# Patient Record
Sex: FEMALE | Race: ASIAN | NOT HISPANIC OR LATINO | Employment: UNEMPLOYED | URBAN - METROPOLITAN AREA
[De-identification: names, ages, dates, MRNs, and addresses within clinical notes are randomized per-mention and may not be internally consistent; named-entity substitution may affect disease eponyms.]

---

## 2020-03-11 ENCOUNTER — MEDICAL CORRESPONDENCE (OUTPATIENT)
Dept: HEALTH INFORMATION MANAGEMENT | Facility: CLINIC | Age: 30
End: 2020-03-11

## 2021-09-23 ENCOUNTER — OFFICE VISIT (OUTPATIENT)
Dept: FAMILY MEDICINE | Facility: CLINIC | Age: 31
End: 2021-09-23
Payer: MEDICAID

## 2021-09-23 VITALS
HEART RATE: 73 BPM | HEIGHT: 57 IN | OXYGEN SATURATION: 99 % | WEIGHT: 110.8 LBS | DIASTOLIC BLOOD PRESSURE: 77 MMHG | SYSTOLIC BLOOD PRESSURE: 114 MMHG | RESPIRATION RATE: 16 BRPM | TEMPERATURE: 98.2 F | BODY MASS INDEX: 23.9 KG/M2

## 2021-09-23 DIAGNOSIS — Z02.89 HEALTH EXAMINATION OF DEFINED SUBPOPULATION: Primary | ICD-10-CM

## 2021-09-23 DIAGNOSIS — Z30.41 ENCOUNTER FOR SURVEILLANCE OF CONTRACEPTIVE PILLS: ICD-10-CM

## 2021-09-23 LAB
ALBUMIN SERPL-MCNC: 4.3 G/DL (ref 3.5–5)
ALP SERPL-CCNC: 49 U/L (ref 45–120)
ALT SERPL W P-5'-P-CCNC: 24 U/L (ref 0–45)
ANION GAP SERPL CALCULATED.3IONS-SCNC: 13 MMOL/L (ref 5–18)
AST SERPL W P-5'-P-CCNC: 20 U/L (ref 0–40)
BASOPHILS # BLD AUTO: 0.1 10E3/UL (ref 0–0.2)
BASOPHILS NFR BLD AUTO: 1 %
BILIRUB SERPL-MCNC: 0.4 MG/DL (ref 0–1)
BUN SERPL-MCNC: 6 MG/DL (ref 8–22)
CALCIUM SERPL-MCNC: 9.5 MG/DL (ref 8.5–10.5)
CHLORIDE BLD-SCNC: 108 MMOL/L (ref 98–107)
CHOLEST SERPL-MCNC: 174 MG/DL
CO2 SERPL-SCNC: 20 MMOL/L (ref 22–31)
CREAT SERPL-MCNC: 0.71 MG/DL (ref 0.6–1.1)
EOSINOPHIL # BLD AUTO: 0.2 10E3/UL (ref 0–0.7)
EOSINOPHIL NFR BLD AUTO: 2 %
ERYTHROCYTE [DISTWIDTH] IN BLOOD BY AUTOMATED COUNT: 12.7 % (ref 10–15)
FASTING STATUS PATIENT QL REPORTED: NO
GFR SERPL CREATININE-BSD FRML MDRD: >90 ML/MIN/1.73M2
GLUCOSE BLD-MCNC: 95 MG/DL (ref 70–125)
HCG UR QL: NEGATIVE
HCT VFR BLD AUTO: 43.1 % (ref 35–47)
HDLC SERPL-MCNC: 42 MG/DL
HGB BLD-MCNC: 14.3 G/DL (ref 11.7–15.7)
HIV 1+2 AB+HIV1 P24 AG SERPL QL IA: NEGATIVE
IMM GRANULOCYTES # BLD: 0 10E3/UL
IMM GRANULOCYTES NFR BLD: 0 %
LDLC SERPL CALC-MCNC: 119 MG/DL
LYMPHOCYTES # BLD AUTO: 2 10E3/UL (ref 0.8–5.3)
LYMPHOCYTES NFR BLD AUTO: 21 %
MCH RBC QN AUTO: 29.7 PG (ref 26.5–33)
MCHC RBC AUTO-ENTMCNC: 33.2 G/DL (ref 31.5–36.5)
MCV RBC AUTO: 89 FL (ref 78–100)
MONOCYTES # BLD AUTO: 0.4 10E3/UL (ref 0–1.3)
MONOCYTES NFR BLD AUTO: 5 %
NEUTROPHILS # BLD AUTO: 6.9 10E3/UL (ref 1.6–8.3)
NEUTROPHILS NFR BLD AUTO: 71 %
NRBC # BLD AUTO: 0 10E3/UL
NRBC BLD AUTO-RTO: 0 /100
PLATELET # BLD AUTO: 262 10E3/UL (ref 150–450)
POTASSIUM BLD-SCNC: 3.5 MMOL/L (ref 3.5–5)
PROT SERPL-MCNC: 7.5 G/DL (ref 6–8)
RBC # BLD AUTO: 4.82 10E6/UL (ref 3.8–5.2)
SODIUM SERPL-SCNC: 141 MMOL/L (ref 136–145)
TRIGL SERPL-MCNC: 66 MG/DL
WBC # BLD AUTO: 9.6 10E3/UL (ref 4–11)

## 2021-09-23 PROCEDURE — 86704 HEP B CORE ANTIBODY TOTAL: CPT | Performed by: STUDENT IN AN ORGANIZED HEALTH CARE EDUCATION/TRAINING PROGRAM

## 2021-09-23 PROCEDURE — 86708 HEPATITIS A ANTIBODY: CPT | Performed by: STUDENT IN AN ORGANIZED HEALTH CARE EDUCATION/TRAINING PROGRAM

## 2021-09-23 PROCEDURE — 99385 PREV VISIT NEW AGE 18-39: CPT | Mod: GC | Performed by: STUDENT IN AN ORGANIZED HEALTH CARE EDUCATION/TRAINING PROGRAM

## 2021-09-23 PROCEDURE — 36415 COLL VENOUS BLD VENIPUNCTURE: CPT | Performed by: STUDENT IN AN ORGANIZED HEALTH CARE EDUCATION/TRAINING PROGRAM

## 2021-09-23 PROCEDURE — 86803 HEPATITIS C AB TEST: CPT | Performed by: STUDENT IN AN ORGANIZED HEALTH CARE EDUCATION/TRAINING PROGRAM

## 2021-09-23 PROCEDURE — 80053 COMPREHEN METABOLIC PANEL: CPT | Performed by: STUDENT IN AN ORGANIZED HEALTH CARE EDUCATION/TRAINING PROGRAM

## 2021-09-23 PROCEDURE — 87591 N.GONORRHOEAE DNA AMP PROB: CPT | Performed by: STUDENT IN AN ORGANIZED HEALTH CARE EDUCATION/TRAINING PROGRAM

## 2021-09-23 PROCEDURE — 86706 HEP B SURFACE ANTIBODY: CPT | Performed by: STUDENT IN AN ORGANIZED HEALTH CARE EDUCATION/TRAINING PROGRAM

## 2021-09-23 PROCEDURE — 87340 HEPATITIS B SURFACE AG IA: CPT | Performed by: STUDENT IN AN ORGANIZED HEALTH CARE EDUCATION/TRAINING PROGRAM

## 2021-09-23 PROCEDURE — 99000 SPECIMEN HANDLING OFFICE-LAB: CPT | Performed by: STUDENT IN AN ORGANIZED HEALTH CARE EDUCATION/TRAINING PROGRAM

## 2021-09-23 PROCEDURE — 85025 COMPLETE CBC W/AUTO DIFF WBC: CPT | Performed by: STUDENT IN AN ORGANIZED HEALTH CARE EDUCATION/TRAINING PROGRAM

## 2021-09-23 PROCEDURE — 80061 LIPID PANEL: CPT | Performed by: STUDENT IN AN ORGANIZED HEALTH CARE EDUCATION/TRAINING PROGRAM

## 2021-09-23 PROCEDURE — 86787 VARICELLA-ZOSTER ANTIBODY: CPT | Performed by: STUDENT IN AN ORGANIZED HEALTH CARE EDUCATION/TRAINING PROGRAM

## 2021-09-23 PROCEDURE — 81025 URINE PREGNANCY TEST: CPT | Performed by: STUDENT IN AN ORGANIZED HEALTH CARE EDUCATION/TRAINING PROGRAM

## 2021-09-23 PROCEDURE — 86780 TREPONEMA PALLIDUM: CPT | Performed by: STUDENT IN AN ORGANIZED HEALTH CARE EDUCATION/TRAINING PROGRAM

## 2021-09-23 PROCEDURE — 87389 HIV-1 AG W/HIV-1&-2 AB AG IA: CPT | Performed by: STUDENT IN AN ORGANIZED HEALTH CARE EDUCATION/TRAINING PROGRAM

## 2021-09-23 PROCEDURE — 86682 HELMINTH ANTIBODY: CPT | Mod: 90 | Performed by: STUDENT IN AN ORGANIZED HEALTH CARE EDUCATION/TRAINING PROGRAM

## 2021-09-23 PROCEDURE — 86481 TB AG RESPONSE T-CELL SUSP: CPT | Performed by: STUDENT IN AN ORGANIZED HEALTH CARE EDUCATION/TRAINING PROGRAM

## 2021-09-23 RX ORDER — LEVONORGESTREL AND ETHINYL ESTRADIOL 0.15-0.03
1 KIT ORAL DAILY
Qty: 28 TABLET | Refills: 3 | Status: SHIPPED | OUTPATIENT
Start: 2021-09-23 | End: 2021-11-05

## 2021-09-23 ASSESSMENT — MIFFLIN-ST. JEOR: SCORE: 1100.34

## 2021-09-23 NOTE — PROGRESS NOTES
Initial Refugee Screening Exam    PC staff should enter immunizations into the chart. (RN entered.)     used this visit: A lmbang  was used for this visit.     HEALTH HISTORY    Concerns today:     Yes - She is taking the birth control pill which she received in [place]. She needs to get them here. She is on levonogestral 0.15mg/Ethylestradiol 0.03mg. No issues with current BC.     Country of Origin:  [place]  Year left country of Origin: 1996  Other countries lived in and dates: [place]  Date of Arrival in [place]: 8/25/2021  Is our listed age correct? Yes  Do you go by any other name?: No  Native Language: Concepc[place]  Family in [place]: Mom in [place]  Family in other countries: None    Pre-Departure Medical Screening Examination Reviewed: Yes   Class A conditions: No  Class B conditions: No  Presumptive treatment for intestinal parasites?: Yes - Albendazole and Ivermectin  History of BCG vaccination: No  Chronic or serious illness: No  Hospitalizations: No  Trauma: No    Family history, medication list, problem list and allergies were reviewed and updated as needed in Epic.    ROS:    C: NEGATIVE for fever, chills, change in weight  I: NEGATIVE for worrisome rashes, moles or lesions, spots without sensations (e.g. leprosy)  E: NEGATIVE for vision changes or irritation or red eyes  E/M: NEGATIVE for ear, mouth and throat problems  R: NEGATIVE for significant cough or SOB  CV: NEGATIVE for chest pain, palpitations or peripheral edema  GI: NEGATIVE for nausea, abdominal pain, heartburn, or change in bowel habits  : NEGATIVE for frequency, dysuria, or hematuria  M: NEGATIVE for significant arthralgias or myalgia  N: NEGATIVE for weakness, dizziness or paresthesias, headaches  E: NEGATIVE for temperature intolerance, skin/hair changes  H: NEGATIVE for bleeding problems  P: NEGATIVE for nightmares, no sleep problems, not easily saddened or angered    Mental Health:    1. In the past month, have you had  many bad dreams or nightmares that remind you of   things that happened in your country or refugee camp? Yes - dreams of running away sometimes  2. In the past month, have you felt very sad? No  3. In the past month, have you been thinking too much about the past (even if you did   not want to?) No  4. In the past month, have you avoided situations that remind you of the past? No  (Prompt: Do you turn off the radio or TV if the program is disturbing?)   5. Do any of these problems make it difficult to do what you need to do on a daily   basis?  (Prompt: Are you able to take care of yourself and your family?) No      EXAMINATION:  There were no vitals taken for this visit.  GENERAL: healthy, alert, well nourished, well hydrated, no distress  HENT: ear canals- normal; TMs- normal; Nose- normal; Mouth- no ulcers, no lesions  NECK: no tenderness, no adenopathy, no asymmetry, no masses, no stiffness; thyroid- normal to palpation  RESP: lungs clear to auscultation - no rales, no rhonchi, no wheezes  CV: regular rates and rhythm, normal S1 S2, no S3 or S4 and no murmur, no click or rub -  ABDOMEN: soft, no tenderness, no  hepatosplenomegaly, no masses, normal bowel sounds  SKIN: no suspicious lesions, no rashes  NEURO: strength and tone- normal, sensory exam- grossly normal, mentation- intact, speech- normal, reflexes- symmetric    ASSESSMENT:    1. New Arrival Health Screening   Health examination of defined subpopulation    2. Encounter for surveillance of contraceptive pills  No issues with her pack currently, will refill same dosing. Found closest pharmacy to their home which is [facility]. Had the  help explain/translate where this is.   - levonorgestrel-ethinyl estradiol (NORDETTE) 0.15-30 MG-MCG tablet; Take 1 tablet by mouth daily  Dispense: 28 tablet; Refill: 3      PLAN:    1) Labs:    CMP  Lipid if age >18  CBC with diff  TB Quant if age>5  RPR  Strongyloides Ab  Schistosoma Ab  HIV  Heb B Core Ab  Hep B  Surface Ab  Hep B Surface Ag  Hep C Ab  Hep A Ab  Varicella titer  Urine for GC/Chlamydia if pt of sexually active age  Urine Pregnancy Test if female of childbearing age     2) TB:    PPD if 6 months to under 2 years old, TB Quant if over 2 years old    3) Immunizations to be applied at second visit.      Total of 30 minutes was spent in face to face contact with patient with > 50% in counseling and coordination of care.  Options for treatment and/or follow-up care were reviewed with the patient. Dolly Rich was engaged and actively involved in the decision making process. She verbalized understanding of the options discussed and was satisfied with the final plan.    RTC in 2-3 weeks for discussion of results, treatment (if necessary) and  devlopment of an ongoing plan for care    Discussed with Dr. Bender.    Rubia Cannon MD PGY3  [facility]

## 2021-09-23 NOTE — PROGRESS NOTES
Preceptor Attestation:   Patient seen, evaluated and discussed with the resident. I have verified the content of the note, which accurately reflects my assessment of the patient and the plan of care.   Supervising Physician:  Jordi Bender MD

## 2021-09-24 LAB
GAMMA INTERFERON BACKGROUND BLD IA-ACNC: 0.15 IU/ML
HAV IGG SER QL IA: POSITIVE
HBV CORE AB SERPL QL IA: NEGATIVE
HBV SURFACE AB SERPLBLD QL IA.RAPID: POSITIVE
HBV SURFACE AG SERPL QL IA: NONREACTIVE
HCV AB SERPL QL IA: NEGATIVE
M TB IFN-G BLD-IMP: NEGATIVE
M TB IFN-G CD4+ BCKGRND COR BLD-ACNC: 9.85 IU/ML
MITOGEN IGNF BCKGRD COR BLD-ACNC: 0.03 IU/ML
MITOGEN IGNF BCKGRD COR BLD-ACNC: 0.08 IU/ML
N GONORRHOEA DNA SPEC QL NAA+PROBE: NEGATIVE
QUANTIFERON MITOGEN: 10 IU/ML
QUANTIFERON NIL TUBE: 0.15 IU/ML
QUANTIFERON TB1 TUBE: 0.18 IU/ML
QUANTIFERON TB2 TUBE: 0.23
T PALLIDUM AB SER QL: NEGATIVE
VZV IGG SER QL IA: POSITIVE

## 2021-09-25 LAB — STRONGYLOIDES IGG SER IA-ACNC: 0.8 IV

## 2021-09-30 LAB — SCHISTOSOMA IGG SER QL: NEGATIVE

## 2021-10-26 NOTE — PROGRESS NOTES
REFUGEE SCREENING: SECOND VISIT    Subjective:   Would like to have another baby and asking if she should stop the birth control. No PNV at home.     Labs from Initial Refugee Screening Visit were reviewed       No visits with results within 1 Month(s) from this visit.   Latest known visit with results is:   Office Visit on 09/23/2021   Component Date Value Ref Range Status     hCG Urine Qualitative 09/23/2021 Negative  Negative Final    This test is for screening purposes.  Results should be interpreted along with the clinical picture.  Confirmation testing is available if warranted by ordering [id number], HCG Quantitative Pregnancy.     Cholesterol 09/23/2021 174  <=199 mg/dL Final     Triglycerides 09/23/2021 66  <=149 mg/dL Final     Direct Measure HDL 09/23/2021 42* >=50 mg/dL Final    HDL Cholesterol Reference Range:     0-2 years:   No reference ranges established for patients under 2 years old  at [facility] for lipid analytes.    2-8 years:  Greater than 45 mg/dL     18 years and older:   Female: Greater than or equal to 50 mg/dL   Male:   Greater than or equal to 40 mg/dL     LDL Cholesterol Calculated 09/23/2021 119  <=129 mg/dL Final     Patient Fasting > 8hrs? 09/23/2021 No   Final     Hepatitis B Surface Antigen 09/23/2021 Nonreactive  Nonreactive Final     Varicella Zoster Antibody IgG 09/23/2021 Positive   Final     Hepatitis C Antibody 09/23/2021 Negative  Negative Final     Hepatitis B Surface Antibody 09/23/2021 Positive* Negative Final     Hepatitis B Core Antibody Total 09/23/2021 Negative  Negative Final     Hepatitis A Antibody IgG 09/23/2021 Positive* Negative Final     HIV Antigen Antibody Combo 09/23/2021 Negative  Negative Final     Strongyloides Claudine IgG 09/23/2021 0.8  <=0.9 IV Final    INTERPRETIVE INFORMATION: Strongyloides Ab, IgG by ELINA      0.9 IV or less....... Negative - No significant                          level of Strongyloides IgG                          antibody  detected.       1.0 IV................Equivocal - The Strongyloides IgG                           antibody result is borderline and                           therefore inconclusive. Recommend                           retesting the patient in 2-4 weeks,                          if clinically indicated.      1.1 IV or greater ... Positive - IgG antibodies to                          Strongyloides detected, which                          may suggest current or past                          infection.    False-positive results may occur with prior exposure to   other helminth infections. Testing low-prevalence   populations may also result in false-positive results.     Treponema Antibody Total 09/23/2021 Negative  Negative Final     Sodium 09/23/2021 141  136 - 145 mmol/L Final     Potassium 09/23/2021 3.5  3.5 - 5.0 mmol/L Final     Chloride 09/23/2021 108* 98 - 107 mmol/L Final     Carbon Dioxide (CO2) 09/23/2021 20* 22 - 31 mmol/L Final     Anion Gap 09/23/2021 13  5 - 18 mmol/L Final     Urea Nitrogen 09/23/2021 6* 8 - 22 mg/dL Final     Creatinine 09/23/2021 0.71  0.60 - 1.10 mg/dL Final     Calcium 09/23/2021 9.5  8.5 - 10.5 mg/dL Final     Glucose 09/23/2021 95  70 - 125 mg/dL Final     Alkaline Phosphatase 09/23/2021 49  45 - 120 U/L Final     AST 09/23/2021 20  0 - 40 U/L Final     ALT 09/23/2021 24  0 - 45 U/L Final     Protein Total 09/23/2021 7.5  6.0 - 8.0 g/dL Final     Albumin 09/23/2021 4.3  3.5 - 5.0 g/dL Final     Bilirubin Total 09/23/2021 0.4  0.0 - 1.0 mg/dL Final     GFR Estimate 09/23/2021 >90  >60 mL/min/1.73m2 Final    As of July 11, 2021, eGFR is calculated by the CKD-EPI creatinine equation, without race adjustment. eGFR can be influenced by muscle mass, exercise, and diet. The reported eGFR is an estimation only and is only applicable if the renal function is stable.     Neisseria gonorrhoeae 09/23/2021 Negative  Negative Final    Negative for N. gonorrhoeae rRNA by transcription mediated  amplification. A negative result by transcription mediated amplification does not preclude the presence of C. trachomatis infection because results are dependent on proper and adequate collection, absence of inhibitors and sufficient rRNA to be detected.     Schistosoma Ab IgG 09/23/2021 Negative  Negative Final     WBC Count 09/23/2021 9.6  4.0 - 11.0 10e3/uL Final     RBC Count 09/23/2021 4.82  3.80 - 5.20 10e6/uL Final     Hemoglobin 09/23/2021 14.3  11.7 - 15.7 g/dL Final     Hematocrit 09/23/2021 43.1  35.0 - 47.0 % Final     MCV 09/23/2021 89  78 - 100 fL Final     MCH 09/23/2021 29.7  26.5 - 33.0 pg Final     MCHC 09/23/2021 33.2  31.5 - 36.5 g/dL Final     RDW 09/23/2021 12.7  10.0 - 15.0 % Final     Platelet Count 09/23/2021 262  150 - 450 10e3/uL Final     % Neutrophils 09/23/2021 71  % Final     % Lymphocytes 09/23/2021 21  % Final     % Monocytes 09/23/2021 5  % Final     % Eosinophils 09/23/2021 2  % Final     % Basophils 09/23/2021 1  % Final     % Immature Granulocytes 09/23/2021 0  % Final     NRBCs per 100 WBC 09/23/2021 0  <1 /100 Final     Absolute Neutrophils 09/23/2021 6.9  1.6 - 8.3 10e3/uL Final     Absolute Lymphocytes 09/23/2021 2.0  0.8 - 5.3 10e3/uL Final     Absolute Monocytes 09/23/2021 0.4  0.0 - 1.3 10e3/uL Final     Absolute Eosinophils 09/23/2021 0.2  0.0 - 0.7 10e3/uL Final     Absolute Basophils 09/23/2021 0.1  0.0 - 0.2 10e3/uL Final     Absolute Immature Granulocytes 09/23/2021 0.0  <=0.0 10e3/uL Final     Absolute NRBCs 09/23/2021 0.0  10e3/uL Final     Quantiferon Nil Tube 09/23/2021 0.15  IU/mL Final     Quantiferon TB1 Tube 09/23/2021 0.18  IU/mL Final     Quantiferon TB2 Tube 09/23/2021 0.23   Final     Quantiferon Mitogen 09/23/2021 10.00  IU/mL Final     Quantiferon-TB Gold Plus 09/23/2021 Negative  Negative Final    No interferon gamma response to M.tuberculosis antigens was detected. Infection with M.tuberculosis is unlikely, however a single negative result does not  exclude infection. In patients at high risk for infection, a second test should be considered in accordance with the 2017 ATS/IDSA/CDC Clinical Pract  ice Guidelines for Diagnosis of Tuberculosis in Adults and Children      TB1 Ag minus Nil Value 09/23/2021 0.03  IU/mL Final     TB2 Ag minus Nil Value 09/23/2021 0.08  IU/mL Final     Mitogen minus Nil Result 09/23/2021 9.85  IU/mL Final     Nil Result 09/23/2021 0.15  IU/mL Final        ROS:  General: No fevers, sleeping well at night  Head: No headache  Neck: No swallowing problems   CV: No chest pain or palpitations  Resp: No shortness of breath.  No cough.  GI: No constipation, or diarrhea, no nausea or vomiting  : No urinary c/o    Objective:  /77 (BP Location: Left arm, Patient Position: Sitting, Cuff Size: Adult Regular)   Pulse 80   Temp 98.6  F (37  C) (Oral)   Resp 19   Wt 52 kg (114 lb 11.2 oz)   LMP 10/09/2021 (Exact Date)   SpO2 99%   BMI 24.88 kg/m    Gen:  Well nourished and in NAD  HEENT: nasopharynx pink and moist; oropharynx pink and moist  Neck: supple without lymphadenopathy  CV:  RRR  - no murmurs, rubs, or gallups,   Pulm:  CTAB, no wheezes/rales/rhonchi, good air entry   ABD: soft, nontender  Extrem: no cyanosis, edema or clubbing;   Psych: Euthymic     Assessment/Plan:  Health examination of defined subpopulation  Discussed stopping her OCPs and starting PNV. Sent message to KAROLINA and RN to help facilitate getting to the pharmacy. I sent to [facility] as it is closer to their home.   - Prenatal Vit-Fe Fumarate-FA (PRENATAL MULTIVITAMIN W/IRON) 27-0.8 MG tablet; Take 1 tablet by mouth daily  Dispense: 90 tablet; Refill: 3    2. High priority for 2019-nCoV vaccine  - COVID-19,PF,PFIZER     1) Abnormal Lab Results:  None    2) TB:   TB Quant result: Negative    3) Immunizations:  TDaP  Covid vaccine    4) Referrals:  No     5) Follow up Plan:   Return to clinic for women's health exam in two weeks.    We discussed having a visit with a  dentist to establish regular dental care: Yes  We discussed yearly visits with a primary care physician for preventative health care: Yes    Discussed with Dr. Hoskins.  Rubia Cannon MD PGY3  [facility]

## 2021-10-27 ENCOUNTER — TELEPHONE (OUTPATIENT)
Dept: FAMILY MEDICINE | Facility: CLINIC | Age: 31
End: 2021-10-27

## 2021-10-27 ENCOUNTER — OFFICE VISIT (OUTPATIENT)
Dept: FAMILY MEDICINE | Facility: CLINIC | Age: 31
End: 2021-10-27
Payer: MEDICAID

## 2021-10-27 VITALS
BODY MASS INDEX: 24.88 KG/M2 | SYSTOLIC BLOOD PRESSURE: 110 MMHG | TEMPERATURE: 98.6 F | DIASTOLIC BLOOD PRESSURE: 77 MMHG | OXYGEN SATURATION: 99 % | WEIGHT: 114.7 LBS | HEART RATE: 80 BPM | RESPIRATION RATE: 19 BRPM

## 2021-10-27 DIAGNOSIS — Z23 NEED FOR VACCINATION: ICD-10-CM

## 2021-10-27 DIAGNOSIS — Z02.89 HEALTH EXAMINATION OF DEFINED SUBPOPULATION: Primary | ICD-10-CM

## 2021-10-27 DIAGNOSIS — Z23 HIGH PRIORITY FOR 2019-NCOV VACCINE: ICD-10-CM

## 2021-10-27 PROCEDURE — 91300 COVID-19,PF,PFIZER (12+ YRS): CPT | Performed by: STUDENT IN AN ORGANIZED HEALTH CARE EDUCATION/TRAINING PROGRAM

## 2021-10-27 PROCEDURE — 90715 TDAP VACCINE 7 YRS/> IM: CPT | Performed by: STUDENT IN AN ORGANIZED HEALTH CARE EDUCATION/TRAINING PROGRAM

## 2021-10-27 PROCEDURE — 90471 IMMUNIZATION ADMIN: CPT | Performed by: STUDENT IN AN ORGANIZED HEALTH CARE EDUCATION/TRAINING PROGRAM

## 2021-10-27 PROCEDURE — 99213 OFFICE O/P EST LOW 20 MIN: CPT | Mod: 25 | Performed by: STUDENT IN AN ORGANIZED HEALTH CARE EDUCATION/TRAINING PROGRAM

## 2021-10-27 PROCEDURE — 0001A COVID-19,PF,PFIZER (12+ YRS): CPT | Performed by: STUDENT IN AN ORGANIZED HEALTH CARE EDUCATION/TRAINING PROGRAM

## 2021-10-27 RX ORDER — PRENATAL VIT/IRON FUM/FOLIC AC 27MG-0.8MG
1 TABLET ORAL DAILY
Qty: 90 TABLET | Refills: 3 | Status: SHIPPED | OUTPATIENT
Start: 2021-10-27 | End: 2021-11-05

## 2021-10-27 NOTE — TELEPHONE ENCOUNTER
Message sent to Amy Marcos, refugee resettlement , requesting he help patient get to [facility] to  medication. ./LR

## 2021-11-04 DIAGNOSIS — Z02.89 HEALTH EXAMINATION OF DEFINED SUBPOPULATION: ICD-10-CM

## 2021-11-04 RX ORDER — LEVONORGESTREL AND ETHINYL ESTRADIOL 0.15-0.03
1 KIT ORAL DAILY
Qty: 28 TABLET | Refills: 3 | Status: CANCELLED | OUTPATIENT
Start: 2021-11-04

## 2021-11-04 RX ORDER — PRENATAL VIT/IRON FUM/FOLIC AC 27MG-0.8MG
1 TABLET ORAL DAILY
Qty: 90 TABLET | Refills: 3 | Status: CANCELLED | OUTPATIENT
Start: 2021-11-04

## 2021-11-04 NOTE — TELEPHONE ENCOUNTER
Pharmacist comments: Per patient's insurance [facility] says Dr. Cannon is not a [facility] provider. Need a different doctor to send this RX.    Please advise.    JOSE JacobsA

## 2021-11-05 RX ORDER — PRENATAL VIT/IRON FUM/FOLIC AC 27MG-0.8MG
1 TABLET ORAL DAILY
Qty: 90 TABLET | Refills: 3 | Status: SHIPPED | OUTPATIENT
Start: 2021-11-05 | End: 2021-12-07

## 2021-11-05 RX ORDER — LEVONORGESTREL AND ETHINYL ESTRADIOL 0.15-0.03
1 KIT ORAL DAILY
Qty: 28 TABLET | Refills: 3 | Status: CANCELLED | OUTPATIENT
Start: 2021-11-05

## 2021-11-05 RX ORDER — PRENATAL VIT/IRON FUM/FOLIC AC 27MG-0.8MG
1 TABLET ORAL DAILY
Qty: 90 TABLET | Refills: 3 | Status: CANCELLED | OUTPATIENT
Start: 2021-11-05

## 2021-11-05 RX ORDER — LEVONORGESTREL AND ETHINYL ESTRADIOL 0.15-0.03
1 KIT ORAL DAILY
Qty: 28 TABLET | Refills: 3 | Status: SHIPPED | OUTPATIENT
Start: 2021-11-05 | End: 2021-11-18

## 2021-11-24 NOTE — TELEPHONE ENCOUNTER
11/24/2021: Care Coordination     Using a [facility]  I booked a ride for Ms. Alberto and her  for their appointment on 12/7/2021 at 9:00am. The ride service was booked through their insurance provided [employer]. They will be picked up by [employer] [place] [facility] service [phone]. They will be picked up at: 8:00am and need to use will call for their return ride home.    Naga Horn  Care Coordination   Direct: [phone]

## 2021-12-03 NOTE — TELEPHONE ENCOUNTER
Transportation changed to [employer] as  has appointment same day and they will be coming together. ./LR

## 2021-12-06 NOTE — PROGRESS NOTES
Female Physical Note    Concerns today: Dizziness that started 2.5 weeks ago. She is nauseous and vomiting. She is fatigued.    LMP was 10/10/21. She has NOT been taking the PNV every day because every time she tries to pick them up they tell her insurance won't cover it.     A [employer]  was used for this visit.    ROS:  CONSTITUTIONAL: no fatigue, no unexpected change in weight  SKIN: no worrisome rashes, no worrisome moles, no worrisome lesions  EYES: no acute vision problems or changes  ENT: no ear problems, no mouth problems, no throat problems  RESP: no significant cough, no shortness of breath  BREAST: no masses, no tenderness, no discharge  CV: no chest pain, no palpitations, no new or worsening peripheral edema  GI: no nausea, no vomiting, no constipation, no diarrhea  : no frequency, no dysuria, no hematuria  MS: no claudication, no myalgias, no joint aches  NEURO: no weakness, no dizziness, no syncope, no headaches  ENDOCRINE: no temperature intolerance, no skin/hair changes  HEME: no easy bruising, no bleeding problems  PSYCHIATRIC: NEGATIVE for changes in mood or affect    Sexually Active: Yes  Sexual concerns: No   Contraception: Details: None, positive pregnancy test today   P: 3003  Menarche: Age 17 Patient's last menstrual period was 10/04/2021 (approximate). 10/10/21  STD History: Neg  Last Pap Smear Date: Never had pap smear before.  Abnormal Pap History: Never had pap smear.    There is no problem list on file for this patient.      Current Outpatient Medications   Medication Sig Dispense Refill     Prenatal Vit-Fe Fumarate-FA (PRENATAL MULTIVITAMIN W/IRON) 27-0.8 MG tablet Take 1 tablet by mouth daily 90 tablet 3     pyridOXINE (VITAMIN B6) 25 MG tablet Take 1 tablet (25 mg) by mouth daily 90 tablet 0       History reviewed. No pertinent past medical history.     Family History        Negative family history of: Cancer, Diabetes, Heart Disease          Problem List Medication  "List and Allergy List were reviewed.    Patient is an established patient of this clinic..    Social History     Tobacco Use     Smoking status: Never Smoker     Smokeless tobacco: Never Used   Substance Use Topics     Alcohol use: Never       Children ? yes - 3    Has anyone hurt you physically, for example by pushing, hitting, slapping or kicking you or forcing you to have sex? Denies  Do you feel threatened or controlled by a partner, ex-partner or anyone in your life? Denies    RISK BEHAVIORS AND HEALTHY HABITS:  Tobacco Use/Smoking: No smoking; occasional betal nut use  Illicit Drug Use: None  Do you use alcohol? No  Diet (5-7 servings of fruits/veg daily): Yes   Exercise (30 min accumulated most days):No  Dental Care: No - will provide resources  Calcium 1500 mg/d:  No  Seat Belt Use: Yes     Pap every 3 years for women 21-29. Recommended and patient declined testing for now.    Immunization History   Administered Date(s) Administered     COVID-19,PF,Pfizer (12+ Yrs) 10/27/2021, 12/07/2021     HepB, Unspecified 03/27/2019, 04/25/2019, 10/15/2020     Hepatitis A Immunity: Titer/MD Dx 09/23/2021     Hepatitis B Immunity: Titer 09/23/2021     Influenza, Intradermal, Quad, Pf 03/27/2019, 10/15/2020, 07/27/2021     MMR 03/27/2019, 04/25/2019     Td (Adult), Adsorbed 03/27/2019, 04/25/2019     Tdap (Adacel,Boostrix) 10/27/2021     Varicella Immunity: Titer/MD Dx 09/23/2021    Reviewed Immunization Record Today    EXAMINATION:   /72   Pulse 81   Temp 98.2  F (36.8  C) (Oral)   Resp 20   Ht 1.445 m (4' 8.89\")   Wt 52 kg (114 lb 9.6 oz)   LMP 10/04/2021 (Approximate)   SpO2 98%   BMI 24.90 kg/m    GENERAL: healthy, alert and no distress  EYES: Eyes grossly normal to inspection, extraocular movements - intact, and PERRL  HENT: ear canals- normal; TMs- normal; Nose- normal; Mouth- no ulcers, no lesions  NECK: no tenderness, no adenopathy, no asymmetry, no masses, no stiffness; thyroid- normal to " palpation  RESP: lungs clear to auscultation - no rales, no rhonchi, no wheezes  BREAST: Deferred by patient  CV: regular rates and rhythm, normal S1 S2, no S3 or S4 and no murmur, no click or rub -  ABDOMEN: soft, no tenderness, no  hepatosplenomegaly, no masses, normal bowel sounds  MS: extremities- no gross deformities noted, no edema  SKIN: no suspicious lesions, no rashes  NEURO: strength and tone- normal, sensory exam- grossly normal, mentation- intact, speech- normal, reflexes- symmetric  BACK: no CVA tenderness, no paralumbar tenderness  : Deferred by patient  PSYCH: Alert and oriented times 3; speech- coherent , normal rate and volume; able to articulate logical thoughts, able to abstract reason, no tangential thoughts, no hallucinations or delusions, affect- normal  LYMPHATICS: ant. cervical- normal, post. cervical- normal, axillary- normal, supraclavicular- normal, inguinal- normal    ASSESSMENT/PLAN:  1. High priority for 2019-nCoV vaccine  - COVID-19,PF,PFIZER (12+ Yrs PURPLE LABEL)    2. Absence of menstruation  3. Pregnancy examination or test, positive result  Sent message to [facility] to schedule first prenatal. Will follow up with pharmacy to figure out why PNV haven't been covered / picked up.  - pyridOXINE (VITAMIN B6) 25 MG tablet; Take 1 tablet (25 mg) by mouth daily  Dispense: 90 tablet; Refill: 0  - HCG qualitative urine    4. Routine general medical examination at a health care facility    5. Health examination of defined subpopulation  - Prenatal Vit-Fe Fumarate-FA (PRENATAL MULTIVITAMIN W/IRON) 27-0.8 MG tablet; Take 1 tablet by mouth daily  Dispense: 90 tablet; Refill: 3        Discussed with Dr. Moreno.   Rubia Cannon MD PGY3  [facility]

## 2021-12-07 ENCOUNTER — OFFICE VISIT (OUTPATIENT)
Dept: FAMILY MEDICINE | Facility: CLINIC | Age: 31
End: 2021-12-07
Payer: COMMERCIAL

## 2021-12-07 VITALS
SYSTOLIC BLOOD PRESSURE: 114 MMHG | TEMPERATURE: 98.2 F | WEIGHT: 114.6 LBS | HEIGHT: 57 IN | RESPIRATION RATE: 20 BRPM | OXYGEN SATURATION: 98 % | HEART RATE: 81 BPM | DIASTOLIC BLOOD PRESSURE: 72 MMHG | BODY MASS INDEX: 24.72 KG/M2

## 2021-12-07 DIAGNOSIS — Z02.89 HEALTH EXAMINATION OF DEFINED SUBPOPULATION: ICD-10-CM

## 2021-12-07 DIAGNOSIS — Z23 HIGH PRIORITY FOR 2019-NCOV VACCINE: Primary | ICD-10-CM

## 2021-12-07 DIAGNOSIS — Z00.00 ROUTINE GENERAL MEDICAL EXAMINATION AT A HEALTH CARE FACILITY: ICD-10-CM

## 2021-12-07 DIAGNOSIS — N91.2 ABSENCE OF MENSTRUATION: ICD-10-CM

## 2021-12-07 DIAGNOSIS — Z32.01 PREGNANCY EXAMINATION OR TEST, POSITIVE RESULT: ICD-10-CM

## 2021-12-07 LAB — HCG UR QL: POSITIVE

## 2021-12-07 PROCEDURE — 99395 PREV VISIT EST AGE 18-39: CPT | Mod: 25 | Performed by: STUDENT IN AN ORGANIZED HEALTH CARE EDUCATION/TRAINING PROGRAM

## 2021-12-07 PROCEDURE — 81025 URINE PREGNANCY TEST: CPT | Performed by: STUDENT IN AN ORGANIZED HEALTH CARE EDUCATION/TRAINING PROGRAM

## 2021-12-07 PROCEDURE — 0002A COVID-19,PF,PFIZER (12+ YRS): CPT | Performed by: STUDENT IN AN ORGANIZED HEALTH CARE EDUCATION/TRAINING PROGRAM

## 2021-12-07 PROCEDURE — 91300 COVID-19,PF,PFIZER (12+ YRS): CPT | Performed by: STUDENT IN AN ORGANIZED HEALTH CARE EDUCATION/TRAINING PROGRAM

## 2021-12-07 RX ORDER — PYRIDOXINE HCL (VITAMIN B6) 25 MG
25 TABLET ORAL DAILY
Qty: 90 TABLET | Refills: 0 | Status: SHIPPED | OUTPATIENT
Start: 2021-12-07 | End: 2022-10-24

## 2021-12-07 RX ORDER — PRENATAL VIT/IRON FUM/FOLIC AC 27MG-0.8MG
1 TABLET ORAL DAILY
Qty: 90 TABLET | Refills: 3 | Status: SHIPPED | OUTPATIENT
Start: 2021-12-07 | End: 2022-10-24

## 2021-12-07 ASSESSMENT — MIFFLIN-ST. JEOR: SCORE: 1116.95

## 2021-12-07 NOTE — PROGRESS NOTES
Preceptor Attestation:    I discussed the patient with the resident and evaluated the patient in person. I have verified the content of the note, which accurately reflects my assessment of the patient and the plan of care.   Supervising Physician:  Joselo Moreno MD.

## 2021-12-23 ENCOUNTER — ALLIED HEALTH/NURSE VISIT (OUTPATIENT)
Dept: FAMILY MEDICINE | Facility: CLINIC | Age: 31
End: 2021-12-23
Payer: COMMERCIAL

## 2021-12-23 ENCOUNTER — OFFICE VISIT (OUTPATIENT)
Dept: FAMILY MEDICINE | Facility: CLINIC | Age: 31
End: 2021-12-23
Payer: COMMERCIAL

## 2021-12-23 VITALS
SYSTOLIC BLOOD PRESSURE: 105 MMHG | WEIGHT: 115.2 LBS | OXYGEN SATURATION: 99 % | TEMPERATURE: 98.1 F | HEART RATE: 75 BPM | HEIGHT: 57 IN | DIASTOLIC BLOOD PRESSURE: 72 MMHG | BODY MASS INDEX: 24.85 KG/M2 | RESPIRATION RATE: 16 BRPM

## 2021-12-23 DIAGNOSIS — Z60.3 LANGUAGE BARRIER, CULTURAL DIFFERENCES: ICD-10-CM

## 2021-12-23 DIAGNOSIS — O09.90 SUPERVISION OF HIGH RISK PREGNANCY, ANTEPARTUM: Primary | ICD-10-CM

## 2021-12-23 DIAGNOSIS — Z02.89 ENCOUNTER FOR HEALTH EXAMINATION OF REFUGEE: ICD-10-CM

## 2021-12-23 DIAGNOSIS — Z34.80 SUPERVISION OF OTHER NORMAL PREGNANCY, ANTEPARTUM: Primary | ICD-10-CM

## 2021-12-23 DIAGNOSIS — R11.2 NAUSEA AND VOMITING, INTRACTABILITY OF VOMITING NOT SPECIFIED, UNSPECIFIED VOMITING TYPE: ICD-10-CM

## 2021-12-23 DIAGNOSIS — Z59.9 FINANCIAL DIFFICULTIES: ICD-10-CM

## 2021-12-23 DIAGNOSIS — Z59.41 FOOD INSECURITY: ICD-10-CM

## 2021-12-23 LAB
ABO/RH(D): NORMAL
ANTIBODY SCREEN: NEGATIVE
CLUE CELLS: ABNORMAL
ERYTHROCYTE [DISTWIDTH] IN BLOOD BY AUTOMATED COUNT: 13.4 % (ref 10–15)
HCT VFR BLD AUTO: 39.5 % (ref 35–47)
HGB BLD-MCNC: 13.5 G/DL (ref 11.7–15.7)
HIV 1+2 AB+HIV1 P24 AG SERPL QL IA: NEGATIVE
MCH RBC QN AUTO: 29.9 PG (ref 26.5–33)
MCHC RBC AUTO-ENTMCNC: 34.2 G/DL (ref 31.5–36.5)
MCV RBC AUTO: 88 FL (ref 78–100)
PLATELET # BLD AUTO: 386 10E3/UL (ref 150–450)
RBC # BLD AUTO: 4.51 10E6/UL (ref 3.8–5.2)
SPECIMEN EXPIRATION DATE: NORMAL
SPECIMEN EXPIRATION DATE: NORMAL
TRICHOMONAS, WET PREP: ABNORMAL
WBC # BLD AUTO: 9.5 10E3/UL (ref 4–11)
WBC'S/HIGH POWER FIELD, WET PREP: ABNORMAL
YEAST, WET PREP: ABNORMAL

## 2021-12-23 PROCEDURE — 87491 CHLMYD TRACH DNA AMP PROBE: CPT

## 2021-12-23 PROCEDURE — 99213 OFFICE O/P EST LOW 20 MIN: CPT | Mod: GC

## 2021-12-23 PROCEDURE — 85027 COMPLETE CBC AUTOMATED: CPT

## 2021-12-23 PROCEDURE — 87340 HEPATITIS B SURFACE AG IA: CPT

## 2021-12-23 PROCEDURE — 87210 SMEAR WET MOUNT SALINE/INK: CPT

## 2021-12-23 PROCEDURE — 86850 RBC ANTIBODY SCREEN: CPT

## 2021-12-23 PROCEDURE — 99207 PR NO CHARGE NURSE ONLY: CPT

## 2021-12-23 PROCEDURE — 36415 COLL VENOUS BLD VENIPUNCTURE: CPT

## 2021-12-23 PROCEDURE — 87086 URINE CULTURE/COLONY COUNT: CPT

## 2021-12-23 PROCEDURE — 86901 BLOOD TYPING SEROLOGIC RH(D): CPT

## 2021-12-23 PROCEDURE — 86900 BLOOD TYPING SEROLOGIC ABO: CPT

## 2021-12-23 PROCEDURE — 87591 N.GONORRHOEAE DNA AMP PROB: CPT

## 2021-12-23 PROCEDURE — 86780 TREPONEMA PALLIDUM: CPT

## 2021-12-23 PROCEDURE — G0123 SCREEN CERV/VAG THIN LAYER: HCPCS

## 2021-12-23 PROCEDURE — 86762 RUBELLA ANTIBODY: CPT

## 2021-12-23 PROCEDURE — 87389 HIV-1 AG W/HIV-1&-2 AB AG IA: CPT

## 2021-12-23 PROCEDURE — 99000 SPECIMEN HANDLING OFFICE-LAB: CPT

## 2021-12-23 PROCEDURE — 83655 ASSAY OF LEAD: CPT | Mod: 90

## 2021-12-23 RX ORDER — ONDANSETRON 4 MG/1
4 TABLET, ORALLY DISINTEGRATING ORAL EVERY 8 HOURS PRN
Qty: 30 TABLET | Refills: 0 | Status: SHIPPED | OUTPATIENT
Start: 2021-12-23 | End: 2022-02-24

## 2021-12-23 SDOH — SOCIAL STABILITY - SOCIAL INSECURITY: ACCULTURATION DIFFICULTY: Z60.3

## 2021-12-23 SDOH — ECONOMIC STABILITY - FOOD INSECURITY: FOOD INSECURITY: Z59.41

## 2021-12-23 SDOH — ECONOMIC STABILITY - INCOME SECURITY: PROBLEM RELATED TO HOUSING AND ECONOMIC CIRCUMSTANCES, UNSPECIFIED: Z59.9

## 2021-12-23 ASSESSMENT — MIFFLIN-ST. JEOR: SCORE: 1125.38

## 2021-12-23 NOTE — PROGRESS NOTES
Past Medical History     Do you have a history of any of the following medical conditions?    Condition No/Yes/Details   Hypertension No    Heart disease, mitral valve prolapse, rheumatic fever No    Asthma or another chronic lung disease No    An autoimmune disorder No    Kidney disease No    Frequent urinary tract infections No    Epilepsy, seizures, or spells No    Migraine headaches No    Stroke, loss of sensation/function, seizures, or other neuro problem No    Diabetes No    Thyroid problems or have you taken thyroid medication No    Hepatitis, liver disease, jaundice No    Blood clots, phlebitis, pulmonary embolism or varicose veins No    Excessive bleeding after surgery or dental work No    Do you have more bleeding than other women after a cut or a scratch? No    Anemia  YES during pregnancy   Blood transfusions No         Would you refuse a blood transfusion?       No   If yes, then ask next question. If no, skip next question.   Would you rather die than receive a blood transfusion? No    Breast problems No    Have you ever ?  YES plans to breastfeed   Abnormalities of the uterus No    Abnormal pap smear No    Have you ever been treated for depression? No    Are you having problems with crying spells or loss of self-esteem? No    Have you ever required psychiatric care? No    Have you been physically, sexually, or emotionally hurt by someone? No    Have you been in a major accident or suffered serious trauma? No    Have you ever had any gynecological surgical procedures such as cervical conization, LEEP, laser treatment, cryosurgery of the cervix, or a dilatation and curettage? No    Have you had any other surgical procedures? No         Have you ever had any complications from anesthesia? No    Have you ever been hospitalized for a nonsurgical reason? No             Substance use and exposure     Does anyone in your home smoke? No    Do you use tobacco products or betel nut? No    Do you  drink beer, wine, or hard liquor? No    Do you use any of the following: marijuana, speed, cocaine, heroin, hallucinogens, or other drugs? No               Symptoms since Last Menstrual Period     Do you currently have any of the following symptoms: No/Yes/Details        Abdominal pain No         Blood in the stools or urine No         Chest pain No         Shortness of breath No         coughing or vomiting up blood No         Your heart racing or skipping beats No         Nausea or vomiting  YES vomiting 3 x's per day        Pain on urination No         Vaginal discharge or bleeding  YES vaginal discharge- white               Genetic Screening          Is the patient 35 years or older? No      Do you have a history of any of the following No/Yes/Details        A metabolic disorder (e.g. Insulin-dependent DM, PKU) No         Recurrent pregnancy loss or still birth No      Do you, the baby's father, or anyone in your families have          Thalassemia AND MCV <80 No         Hemophilia No         Neural tube defect No }        Congenital heart defect No         Sickle cell disease or trait No         Muscular dystrophy No         Cystic fibrosis No         Mental retardation or autism No         Down's syndrome No         Mj-Sach's disease No         Rocco's chorea No         Any other inherited genetic or chromosomal disorder No         A child with birth defects not listed above No                Infection History     Ever treated for tuberculosis or had a positive skin test No    Ever had genital herpes (or has your partner) No    Had a rash or viral illness since LMP No    Ever had a sexually transmitted infection No    Ever had chicken pox or the vaccine  YES immune by titer   Have you had a sexual partner who is HIV positive No    Ever had any other serious infectious disease No                Risk Assessment     Average Risk Category  No significant risk factors: Yes    At Risk Category (up to  3)  Teen pregnancy: No  Poor social situation: No  Domestic abuse: No  Financial difficulties: Yes  Smoker: No  H/O  deliver: No  H/O drug abuse: No  Non-English speaking: Yes  Advanced maternal age: No  GDM risks: No  Previous C/S: No  H/O PIH: No  H/O STIs: No  H/O mental health concerns: No  Onset care > 20 weeks: No      High Risk Category (4 or more At Risk or)  Diabetes/GDM: No  Multiple gestation: No  Chronic hypertension: No  Significant hx of asthma: No  Fetal demise > 20 weeks: No  Positive tox screen: No  Current mental health treatment: No      Risk: High Risk   Date determined: 21

## 2021-12-23 NOTE — PROGRESS NOTES
First Obstetric Visit           Assessment and Plan     Dolly was seen today for prenatal care and dizziness.    Diagnoses and all orders for this visit:    Supervision of other normal pregnancy, antepartum  -     ABO/Rh Type-HML; Future  -     Antibody Screen; Future  -     CBC with Plt; Future  -     Culture Urine; Future  -     Hepatitis B Surface Ag; Future  -     HIV Ag/Ab Screen Cascade; Future  -     Lead, Blood; Future  -     Rubella Antibody IgG; Future  -     Syphilis Screen Cascade; Future  -     GYN Cytology  -     Wet Prep  -     Chlamydia trachomatis PCR; Future  -     Neisseria gonorrhoeae PCR; Future  -     Antibody Screen  -     ABO/Rh Type-HML  -     Syphilis Screen Cascade  -     Rubella Antibody IgG  -     Lead, Blood  -     HIV Ag/Ab Screen Cascade  -     Hepatitis B Surface Ag  -     CBC with Plt  -     Culture Urine        31 year old  , 10w5d weeks of pregnancy with TERESITA of 2022 by LMP of Patient's last menstrual period was 10/09/2021 (approximate)..      Pregnancy Risk Assessment: Average risk pregnancy  Discussed high risk conditions as follows: NA    -Dating US obtained and dating confirmed?   No->Scheduled 1/3/21  -Taking PNV/Folate?     Yes     - Ordered new OB labs: blood type and antibody screen, HIV, VDRL, hep B, rubella, UA/UC, gonorrhea/chlamydia, Pap smear and Wet prep done today.    -Discussed risks and benefits of second trimester quad screen for trisomies between 15-20 weeks EGA, patient agreed. Will obtain when appropriate.   - Discussed genetic screening. Patient does want to pursue screening.   - Discussed screening for sickle cell anemia. Patient does not  want to pursue Hb electrophoresis.     - Discussed early screening for gestational diabetes. The patient does not have a history of GDM, BMI>30, h/o prediabetes/glucose intolerance, first degree relative with GDM or DM, or chronic HTN, so WILL NOT obtain early GCT or A1c.    - The patient does not h/o severe,  early preeclampsia with delivery <34weeks, preeclampsia in more than one pregnancy, pre-gestational diabetes, chronic hypertension, renal disease, or autoimmune disease so WILL NOT start low dose aspirin (81mg) and calcium supplementation (1g-1.5g/day elemental = 2500mg- 3750mg/day Calcium carbonate) to prevent preeclampsia.    - The patient  does not have a history of spontaneous  birth so  WILL NOT consider progesterone starting at 16-20 weeks and/or serial transvaginal cervical length ultrasounds from 16-24 weeks.    - Prenatal vitamins ordered.  - Flu shot offered and was Declined.    Counseling given:   - Follow up in 4 weeks for return OB visit.  - Recommended weight gain for pregnancy: 25-35 lbs (pregravid BMI 18.5-24.9)      - Instructed on best evidence for: healthy diet and foods to avoid; exercise and activity during pregnancy; avoiding exposure to toxoplasmosis; safe use of seatbelts during pregnancy; and maintenance of a generally healthy lifestyle  -Patient to see OB educator/ RN today and/or next visit.    Discussed the harms, benefits, side effects and alternative therapies for current prescribed and OTC medications.      There are no Patient Instructions on file for this visit.    Options for treatment and follow-up care were reviewed with the patient and/or guardian. Dolly Rich and/or guardian engaged in the decision making process and verbalized understanding of the options discussed and agreed with the final plan.    Filomena Manuel MD         HPI       Dolly Rich is a 31 year old woman who presents for an initial prenatal visit at 10w5d weeks of pregnancy with TERESITA of 2022 by LMP of Patient's last menstrual period was 10/09/2021 (approximate)..      She has not had bleeding since her LMP.   She has had nausea resulting in non-bilious and non-bloody   Weight loss has not occurred.    This was a planned pregnancy.     OTHER CONCERNS: Patient concerned about N/V.              Labor  "Risk Assessment     Is the patient's age <18 or >40?     No  Patint's BMI is Body mass index is 24.71 kg/m .   Does patient have a BMI < 18.5?     No  Prior delivery within 6 months?      No  Ever delivered prior to 37 weeks gestation?  No  Pregnancy occur via In Vitro Fertilization?   No  Are you carrying twins?       No    The patient has the following additional risk factors for  labor:   NA    none     Labor Risk Summary:  Pt is high risk for  Labor  No           Past Medical History     No past medical history on file.  See nurse history note, reviewed in detail.             Current Medications      Current Outpatient Medications   Medication Sig Dispense Refill     Prenatal Vit-Fe Fumarate-FA (PRENATAL MULTIVITAMIN W/IRON) 27-0.8 MG tablet Take 1 tablet by mouth daily 90 tablet 3     pyridOXINE (VITAMIN B6) 25 MG tablet Take 1 tablet (25 mg) by mouth daily 90 tablet 0             Review of Systems      ROS:  No - Headache  No - Changes in vision  No - Chest Pain  No - Shortness of Breath  YES - Nausea   YES - Vomiting  No - Abdominal pain   No - Contractions  No - Dysuria   No - Vaginal Discharge    No - Vaginal bleeding   No - Loss of Fluid   No - Extremity swelling     ====================================================           Physical Exam      /72   Pulse 75   Temp 98.1  F (36.7  C)   Resp 16   Ht 1.454 m (4' 9.25\")   Wt 52.3 kg (115 lb 3.2 oz)   LMP 10/09/2021 (Approximate)   SpO2 99%   BMI 24.71 kg/m    GENERAL: healthy, alert and no distress  NECK: no tenderness, no adenopathy, no asymmetry, no masses, no stiffness; thyroid- normal to palpation  RESP: lungs clear to auscultation - no rales, no rhonchi, no wheezes  CV: regular rates and rhythm, normal S1 S2, no S3 or S4 and no murmur, no click or rub -  ABDOMEN: soft, no tenderness, no  hepatosplenomegaly, no masses, normal bowel sounds  MS: extremities- no gross deformities noted, no edema  GENITALIA:  BUS WNL, no " lesions noted   VAGINA:  Pink, normal rugae and discharge normal and physiologic  CERVIX:  smooth, without discharge or CMT and parous os,   firm/ closed  UTERUS: Fundus palpated just above pubic bone  ADNEXA: Without masses or tenderness    Past labs reviewed:  Varicella immune Yes  Hepatitis B immune Yes  Last pap 12/23/21.  She is due for this today.    =========================================

## 2021-12-23 NOTE — LETTER
December 23, 2021      [facility]  [address]   [place] [address]        To Whom It May Concern:    Silva Valentin was seen in our clinic 12/23/21. He may return to work without restrictions.      Sincerely,        Filomena Manuel MD

## 2021-12-23 NOTE — NURSING NOTE
Due to patient being non-English speaking/uses sign language, an  was used for this visit. Only for face-to-face interpretation by an external agency, date and length of interpretation can be found on the scanned worksheet.     name: Adali [id number]  Agency: [employer]  Language: Conce[place]  Telephone number: [phone]  Type of interpretation: Telephone

## 2021-12-23 NOTE — PROGRESS NOTES
Social Work Note:    Data and Intervention: Met with patient during office visit. Provided clothes and shoes for children. Provided box of canned goods and can opener. Arranged apt with [facility], for them to go get winter clothing and household goods:     Thursday 12/30/21 at 11:00am   [facility]   This is an appointment to  clothing, food shelf items, and household supplies from [facility], a nonprofit down the street. Come to clinic and ask for Julissa, , and we will walk over together.     Set up rides:   APPT: Thursday, 12/30/21 at 10: 50am  AM visit with [facility] and [facility]  RIDE: Reginald Louise ([phone]), 10:05am , will call return.    APPT: Monday 1/3/21 at 8:40am visit for ultrasound  RIDE: [employer] ([phone]), 7:55am , will call return.     Assessment and Plan:   1. Apt with WIC will be on Monday 12/27/21 at 9:00am. This will be a phone visit to get you WIC benefits for during your pregnancy.   2. Apt with [facility] will be Thursday 12/30 at 11:00am.   3. See you back in clinic on 1/3    JOSE Diggs

## 2021-12-23 NOTE — PATIENT INSTRUCTIONS
[facility] for Dolly Rich and No Harsh  Telephone APPT: Monday, 12/27/21 at 9:00 AM.    You have an appointment for the clothes shelf on Thursday, 12/30/21 at 11:00am. You will get a medical cab ride to this clinic, arriving at 10:00am, and you should tell the  to ask for Julissa the  or Lyn the nurse. We will walk you over to [employer].

## 2021-12-24 PROBLEM — Z02.89 ENCOUNTER FOR HEALTH EXAMINATION OF REFUGEE: Status: ACTIVE | Noted: 2021-12-23

## 2021-12-24 PROBLEM — Z59.9 FINANCIAL DIFFICULTIES: Status: ACTIVE | Noted: 2021-12-23

## 2021-12-24 PROBLEM — Z59.41 FOOD INSECURITY: Status: ACTIVE | Noted: 2021-12-23

## 2021-12-24 PROBLEM — Z60.3 LANGUAGE BARRIER, CULTURAL DIFFERENCES: Status: ACTIVE | Noted: 2021-12-24

## 2021-12-24 PROBLEM — O09.90 SUPERVISION OF HIGH RISK PREGNANCY, ANTEPARTUM: Status: ACTIVE | Noted: 2021-12-23

## 2021-12-24 LAB
BACTERIA UR CULT: NO GROWTH
BKR LAB AP GYN ADEQUACY: NORMAL
BKR LAB AP GYN INTERPRETATION: NORMAL
BKR LAB AP HPV REFLEX: NORMAL
BKR LAB AP LMP: NORMAL
BKR LAB AP PREVIOUS ABNORMAL: NORMAL
C TRACH DNA SPEC QL NAA+PROBE: NEGATIVE
HBV SURFACE AG SERPL QL IA: NONREACTIVE
LEAD BLDV-MCNC: <2 UG/DL
N GONORRHOEA DNA SPEC QL NAA+PROBE: NEGATIVE
PATH REPORT.COMMENTS IMP SPEC: NORMAL
PATH REPORT.COMMENTS IMP SPEC: NORMAL
PATH REPORT.RELEVANT HX SPEC: NORMAL
RUBV IGG SERPL QL IA: 2.46 INDEX
RUBV IGG SERPL QL IA: POSITIVE
T PALLIDUM AB SER QL: NONREACTIVE

## 2021-12-24 NOTE — PROGRESS NOTES
Family Medicine OB Education    I provided the following OB education to Dolly Rich.    Discussed that Dr Manuel should be the doctor that she sees for her prenatal visits and that provider will try hard to be the one to deliver her baby.  Discussed that if primary provider was unavailable that one of our other physicians would deliver the baby and that this could be a male or female provider.  Briefly discussed residency program and that multiple doctors would be present at time of delivery.  Sheet given and discussed fetal growth and development.  Sheet given and discussed warning signs with reasons to call clinic or L&D with questions or concerns (phone numbers given).  Sheet given and discussed Tdap to be given after 27 weeks gestation and the importance of family members get immunized before delivery.  Proof of pregnancy completed and given to pt.  Gave pt preregistration form for hospital to complete.  See questionnaire and pregnancy risk assessment for further information in provider encounter.     Legal Screener completed and there were no concerns for government benefits, housing, or immigration.  Pt reports financial and food insecurity.  See problem list for details.      Name of provider who requested the OB education: Dr Manuel  Name of provider on site (faculty or community preceptor) at the time of performing the OB education: Dr Boland, RN, BSN

## 2021-12-29 ENCOUNTER — HOSPITAL ENCOUNTER (OUTPATIENT)
Facility: CLINIC | Age: 31
End: 2021-12-29
Payer: COMMERCIAL

## 2021-12-30 ENCOUNTER — TELEPHONE (OUTPATIENT)
Dept: FAMILY MEDICINE | Facility: CLINIC | Age: 31
End: 2021-12-30
Payer: COMMERCIAL

## 2021-12-30 NOTE — TELEPHONE ENCOUNTER
12/30/2021: Care Coordination    Ms. Rich had an appointment with [employer] next door to shop for her and the family. Julissa and I helped her shop for the family with an  on the during our time there.    Naga Horn  Care Coordination

## 2022-01-03 ENCOUNTER — ANCILLARY PROCEDURE (OUTPATIENT)
Dept: ULTRASOUND IMAGING | Facility: CLINIC | Age: 32
End: 2022-01-03
Attending: FAMILY MEDICINE
Payer: COMMERCIAL

## 2022-01-03 DIAGNOSIS — O09.90 SUPERVISION OF HIGH RISK PREGNANCY, ANTEPARTUM: ICD-10-CM

## 2022-01-03 PROCEDURE — 76801 OB US < 14 WKS SINGLE FETUS: CPT | Performed by: RADIOLOGY

## 2022-01-03 NOTE — NURSING NOTE
Due to patient being non-English speaking/uses sign language, an  was used for this visit. Only for face-to-face interpretation by an external agency, date and length of interpretation can be found on the scanned worksheet.     name: evelyne green  Agency: [employer]  Language: Concepción   Telephone number: .  Type of interpretation: Face-to-face, spoken

## 2022-02-14 ENCOUNTER — TELEPHONE (OUTPATIENT)
Dept: FAMILY MEDICINE | Facility: CLINIC | Age: 32
End: 2022-02-14
Payer: COMMERCIAL

## 2022-02-14 NOTE — TELEPHONE ENCOUNTER
----- Message from Filomena Manuel MD sent at 2/14/2022  8:52 AM CST -----  Miki Vance,    I saw this patient at 10 weeks, but it doesn't look like she has been back in to see anyone for her pregnancy. Do we ever follow up? I don't want to bother people but I know they had some social barriers to care.     Thanks!  Lilia Manuel

## 2022-02-14 NOTE — TELEPHONE ENCOUNTER
Called pt with [employer]) and she states that she is doing well.  Assisted her with scheduling SWATI with Dr Manuel on 2/24/22 at 8:40 AM.  Pt asked for a ride to the appt so will call MARIE and will call back with ride info.  Pt states that the pharmacy told her that insurance has her birth date as 3/1/1998 and not 1990 that is her actual birth date (correct in EPIC and on immigration paperwork).  Told her to let her [facility] Refuge[facility] resettlement  know and they should be able to help her correct this.  Pt states that her 3 children need to see the dentist and missed their last appts because the ride never came.  Told her to reschedule and call with appt info and ride can be arranged.    Called MARIE and set up ride with [facility] on 2/24/22 and they will pick her up between 7:40-8:10 AM.    Called pt back with [employer]) and gave her above info/NG

## 2022-02-24 ENCOUNTER — OFFICE VISIT (OUTPATIENT)
Dept: FAMILY MEDICINE | Facility: CLINIC | Age: 32
End: 2022-02-24
Payer: COMMERCIAL

## 2022-02-24 VITALS
OXYGEN SATURATION: 100 % | DIASTOLIC BLOOD PRESSURE: 55 MMHG | TEMPERATURE: 97.3 F | BODY MASS INDEX: 26.99 KG/M2 | WEIGHT: 125.8 LBS | HEART RATE: 87 BPM | SYSTOLIC BLOOD PRESSURE: 93 MMHG | RESPIRATION RATE: 18 BRPM

## 2022-02-24 DIAGNOSIS — Z33.1 PREGNANCY, INCIDENTAL: Primary | ICD-10-CM

## 2022-02-24 PROCEDURE — 99207 PR PRENATAL VISIT: CPT | Mod: GC

## 2022-02-24 RX ORDER — FAMOTIDINE 20 MG/1
20 TABLET, FILM COATED ORAL AT BEDTIME
Qty: 30 TABLET | Refills: 3 | Status: SHIPPED | OUTPATIENT
Start: 2022-02-24 | End: 2022-10-24

## 2022-02-24 RX ORDER — CALCIUM CARBONATE 500 MG/1
1 TABLET, CHEWABLE ORAL 2 TIMES DAILY
Qty: 60 TABLET | Refills: 3 | Status: SHIPPED | OUTPATIENT
Start: 2022-02-24 | End: 2022-10-24

## 2022-02-24 RX ORDER — FAMOTIDINE 20 MG/1
20 TABLET, FILM COATED ORAL 2 TIMES DAILY
Status: CANCELLED | OUTPATIENT
Start: 2022-02-24

## 2022-02-24 NOTE — PATIENT INSTRUCTIONS
Thank you for taking the time to discuss your health with me today.    Today we discussed:  1. Your heartburn/nausea-start Famotidine 1 tablet at night as needed for nausea.  2. Schedule your appointment for your US and next follow up appointment.     As always, please call the clinic or message with any questions or concerns.     Best Wishes,  Filomena Manuel MD.

## 2022-02-24 NOTE — PROGRESS NOTES
Assessment & Plan  31 year old  at 19w5d with TERESITA 2022 based on 12 week US    Dolly was seen today for prenatal care.    Diagnoses and all orders for this visit:    Pregnancy, incidental  -     US OB > 14 Weeks; Future  -     famotidine (PEPCID) 20 MG tablet; Take 1 tablet (20 mg) by mouth At Bedtime  -     calcium carbonate (TUMS) 500 MG chewable tablet; Take 1 tablet (500 mg) by mouth 2 times daily        Weight gain adequate: 7.167 kg (15 lb 12.8 oz) to date, out of recommended total of 11-20 lbs (pregravid BMI >30)    Patient Instructions   Thank you for taking the time to discuss your health with me today.    Today we discussed:  1. Your heartburn/nausea-start Famotidine 1 tablet at night as needed for nausea.  2. Schedule your appointment for your US and next follow up appointment.     As always, please call the clinic or message with any questions or concerns.     Best Wishes,  Filomena Manuel MD.         Return to clinic in 4 weeks.    Filomena Manuel MD  I precepted today with Jordi Bender MD.    Subjective  Concerns: Some nausea, her Zofran was not covered by insurance. A little difficulty with accessing food on the weekend.     ROS:  No - Headache  No - Changes in vision  No - Chest Pain  No - Shortness of Breath  YES - Nausea   No - Vomiting  No - Abdominal pain   No - Contractions  No - Dysuria   No - Vaginal Discharge    No - Vaginal bleeding   No - Loss of Fluid   No - Extremity swelling   Present - Fetal movement       Patient Active Problem List   Diagnosis     Supervision of high risk pregnancy, antepartum     Language barrier, cultural differences     Food insecurity     Financial difficulties     Encounter for health examination of refugee       Dolly Rich speaks Karnikkoi  so an  was used today.    Guidance:  signs of miscarriage  OTC medications  genetic testing  weight gain and exercise  quickening  fetal survey ultrasound    Going to WIC? Yes    Objective  BP 93/55    Pulse 87   Temp 97.3  F (36.3  C)   Resp 18   Wt 57.1 kg (125 lb 12.8 oz)   LMP 10/09/2021 (Approximate)   SpO2 100%   BMI 26.99 kg/m    No distress.  Gravid abdomen.  's.  Fundal height 19 cm.  no edema.    Results  Blood type: O POS  No results found for any visits on 02/24/22.

## 2022-02-24 NOTE — PROGRESS NOTES
Social Work Note:    Data and Intervention: Met with patient during visit to discuss food resources. Gave lists of food shelves in the area and information for [facility], which is down the street from this clinic. Patient can go to [facility] when she is here for a clinic visit.     SNAP: Did get SNAP benefits but needs to renew it. Someone at a community center at [address] [place] [address]  is helping with this.     Assessment and Plan: Patient will look into the food shelf options, and reach out if they still need additional food support.     Julissa Haywood LGSW

## 2022-02-24 NOTE — NURSING NOTE
Due to patient being non-English speaking/uses sign language, an  was used for this visit. Only for face-to-face interpretation by an external agency, date and length of interpretation can be found on the scanned worksheet.     name: [id number]  Agency: [employer]  Language: Concepc[place]   Telephone number: [phone]  Type of interpretation: Telephone, spoken

## 2022-03-14 ENCOUNTER — TELEPHONE (OUTPATIENT)
Dept: FAMILY MEDICINE | Facility: CLINIC | Age: 32
End: 2022-03-14

## 2022-03-14 ENCOUNTER — ANCILLARY PROCEDURE (OUTPATIENT)
Dept: ULTRASOUND IMAGING | Facility: CLINIC | Age: 32
End: 2022-03-14
Attending: FAMILY MEDICINE
Payer: COMMERCIAL

## 2022-03-14 DIAGNOSIS — Z33.1 PREGNANCY, INCIDENTAL: ICD-10-CM

## 2022-03-14 PROCEDURE — 76805 OB US >/= 14 WKS SNGL FETUS: CPT | Performed by: RADIOLOGY

## 2022-03-14 NOTE — NURSING NOTE
Due to patient being non-English speaking/uses sign language, an  was used for this visit. Only for face-to-face interpretation by an external agency, date and length of interpretation can be found on the scanned worksheet.     name: #[id number]  Agency: [employer]  Language: Concepc[place]   Telephone number: [phone]  Type of interpretation: Telephone, spoken

## 2022-03-14 NOTE — TELEPHONE ENCOUNTER
SW met with patient during ultrasound visit today to discuss paperwork. Had 2 envelopes with paperwork from [facility] . States they turned in the requested paperwork but still have not received benefits from this past month.     One envelope had the 6 month renewal for [place] and [place] benefits. The second packet included the same paperwork but with a letter on top stating that they did not receive the January proof of income and spouse. SW encouraged her to reach out to Financial worker.     JOSE Diggs

## 2022-03-31 ENCOUNTER — OFFICE VISIT (OUTPATIENT)
Dept: FAMILY MEDICINE | Facility: CLINIC | Age: 32
End: 2022-03-31
Payer: COMMERCIAL

## 2022-03-31 VITALS
SYSTOLIC BLOOD PRESSURE: 92 MMHG | BODY MASS INDEX: 28.02 KG/M2 | TEMPERATURE: 97.8 F | RESPIRATION RATE: 16 BRPM | DIASTOLIC BLOOD PRESSURE: 63 MMHG | WEIGHT: 130.6 LBS | HEART RATE: 99 BPM | OXYGEN SATURATION: 100 %

## 2022-03-31 DIAGNOSIS — Z34.02 ENCOUNTER FOR SUPERVISION OF NORMAL FIRST PREGNANCY IN SECOND TRIMESTER: ICD-10-CM

## 2022-03-31 DIAGNOSIS — Z34.80 SUPERVISION OF OTHER NORMAL PREGNANCY, ANTEPARTUM: Primary | ICD-10-CM

## 2022-03-31 PROCEDURE — 99207 PR PRENATAL VISIT: CPT | Mod: GC

## 2022-03-31 NOTE — PATIENT INSTRUCTIONS
Thank you for taking the time to discuss your health with me today!    Today we discussed:  1. Your pregnancy is progressing normally! Congrats on the baby girl!  2. Schedule your next appointment in 4 weeks. We will check your glucose at that visit. This will take about 1-2 hours.     As always, please call the clinic or message with any questions or concerns.     Best Wishes,  Filomena Manuel MD.       May 11, 2022   Legal Services Referral - [place] only  Legal referral has been sent to Tobi Grubbs from [facility].     Scan/Send demographics and referral to [email or web address]    Tobi will review, advise, and contact the patient.     Janine White

## 2022-03-31 NOTE — PROGRESS NOTES
Preceptor Attestation:    I discussed the patient with the resident and evaluated the patient in person. I have verified the content of the note, which accurately reflects my assessment of the patient and the plan of care.   Supervising Physician:  El Tomlin MD.

## 2022-03-31 NOTE — PROGRESS NOTES
Social Work Note:    Data and Intervention: SW met with patient to discuss [place] for [place]. [place] which [place] they do have active [facility] insurance. Patient and  also did not get insurance cards this year, they just have the ones from last year still.     Patient also was unable to get medications from pharmacy de to a birth date discrepancy. Patient's YOB: 1990. Pharmacy had it as 3/1/1998.     Called [facility] insurance with patient and  ([phone]). Reached representative after 22 mintues on hold. Informed that the birthdate in the system is correct. Checked express scrips, the birth date is correct there too. Suggested asking the pharmacy to verify the birth date they have.     Reached different representative for the ID cards, who informed that they need to change the address for the insurance. They cannot re-mail the insurance cards now as they would go to the wrong address. She needs to call after 4/7/22 to request the cards, or can go to [email or web address] and download the cards. SW provided [facility] ID numbers for kids today, so she has some sort of insurance proof.     Assessment and Plan:  Will call [facility] again after 4/7/22.   Patient will call Dong Manuel to change address.     JOSE Diggs

## 2022-03-31 NOTE — PROGRESS NOTES
Assessment & Plan  32 year old  at 24w5d with TERESITA 2022 based on 12 week US.    Dolly was seen today for prenatal care.    Diagnoses and all orders for this visit:    Supervision of other normal pregnancy, antepartum  -     Hemoglobin; Future  -     Syphilis Screen Cascade (RPR/VDRL); Future  -     Gestational glucose tolerance testing (GTT 1 hr challenge); Future  -     Legal Services Referral - [place] only; Future        Weight gain adequate: 9.344 kg (20 lb 9.6 oz) to date, out of recommended total of 25-35 lbs (pregravid BMI 18.5-24.9)    Patient Instructions   Thank you for taking the time to discuss your health with me today!    Today we discussed:  1. Your pregnancy is progressing normally! Congrats on the baby girl!  2. Schedule your next appointment in 4 weeks. We will check your glucose at that visit. This will take about 1-2 hours.     As always, please call the clinic or message with any questions or concerns.     Best Wishes,  Filomena Manuel MD.         Return to clinic in 4 weeks.    Filomena Manuel MD  I precepted today with El Tomlin MD.    Subjective  Concerns: No concerns. Feeling a lot of movement. Planning to breast feed.     ROS:  No - Headache  No - Changes in vision  No - Chest Pain  No - Shortness of Breath  No - Nausea   No - Vomiting  No - Abdominal pain   No - Contractions  No - Dysuria   No - Vaginal Discharge    No - Vaginal bleeding   No - Loss of Fluid   No - Extremity swelling   Present - Fetal movement     Going to WIC? Yes    Risk Assessment   Average Risk Category  No significant risk factors: No    At Risk Category (up to 3)  Teen pregnancy: No  Poor social situation: Yes  Domestic abuse: No  Financial difficulties: Yes  Smoker: No  H/O  deliver: No  H/O drug abuse: No  Non-English speaking: Yes  Advanced maternal age: No  GDM risks: No  Previous C/S: No  H/O PIH: No  H/O STIs: No  H/O mental health concerns: No  Onset care > 20 weeks: No  Other:  NA    High Risk Category (4 or more At Risk or)  Diabetes/GDM: No  Multiple gestation: No  Chronic hypertension: No  Significant hx of asthma: No  Fetal demise > 20 weeks: No  Positive tox screen: No  Current mental health treatment: No  Other: NA    Risk: Average Risk   Date determined: 3/31/2022    Patient Active Problem List   Diagnosis     Supervision of high risk pregnancy, antepartum     Language barrier, cultural differences     Food insecurity     Financial difficulties     Encounter for health examination of refugee       Dolly Rich speaks [place]  so an  was used today.    Guidance:  breastfeeding  GDM  signs of  labor    Objective  BP 92/63   Pulse 99   Temp 97.8  F (36.6  C)   Resp 16   Wt 59.2 kg (130 lb 9.6 oz)   LMP 10/09/2021 (Approximate)   SpO2 100%   BMI 28.02 kg/m    No distress.  Gravid abdomen.  's-150's.  Fundal height 24 cm.  no edema.    Results  Blood type: O POS  No results found for any visits on 22.

## 2022-03-31 NOTE — NURSING NOTE
Due to patient being non-English speaking/uses sign language, an  was used for this visit. Only for face-to-face interpretation by an external agency, date and length of interpretation can be found on the scanned worksheet.     name: [id number]  Agency: [employer]  Language: Conce[place]   Telephone number: [phone]  Type of interpretation: Telephone, spoken

## 2022-05-05 ENCOUNTER — DOCUMENTATION ONLY (OUTPATIENT)
Dept: FAMILY MEDICINE | Facility: CLINIC | Age: 32
End: 2022-05-05

## 2022-05-05 ENCOUNTER — OFFICE VISIT (OUTPATIENT)
Dept: FAMILY MEDICINE | Facility: CLINIC | Age: 32
End: 2022-05-05
Payer: COMMERCIAL

## 2022-05-05 VITALS
HEART RATE: 90 BPM | WEIGHT: 136 LBS | DIASTOLIC BLOOD PRESSURE: 65 MMHG | SYSTOLIC BLOOD PRESSURE: 97 MMHG | BODY MASS INDEX: 29.17 KG/M2 | OXYGEN SATURATION: 84 % | TEMPERATURE: 97.8 F | RESPIRATION RATE: 16 BRPM

## 2022-05-05 DIAGNOSIS — D64.9 ANEMIA, UNSPECIFIED TYPE: Primary | ICD-10-CM

## 2022-05-05 DIAGNOSIS — Z34.80 SUPERVISION OF OTHER NORMAL PREGNANCY, ANTEPARTUM: Primary | ICD-10-CM

## 2022-05-05 DIAGNOSIS — Z34.80 SUPERVISION OF OTHER NORMAL PREGNANCY, ANTEPARTUM: ICD-10-CM

## 2022-05-05 LAB
GLUCOSE 1H P 50 G GLC PO SERPL-MCNC: 62 MG/DL (ref 70–129)
HGB BLD-MCNC: 11.3 G/DL (ref 11.7–15.7)

## 2022-05-05 PROCEDURE — 99207 PR PRENATAL VISIT: CPT | Mod: GC

## 2022-05-05 PROCEDURE — 90715 TDAP VACCINE 7 YRS/> IM: CPT

## 2022-05-05 PROCEDURE — 36415 COLL VENOUS BLD VENIPUNCTURE: CPT

## 2022-05-05 PROCEDURE — 85018 HEMOGLOBIN: CPT

## 2022-05-05 PROCEDURE — 90471 IMMUNIZATION ADMIN: CPT

## 2022-05-05 PROCEDURE — 82950 GLUCOSE TEST: CPT

## 2022-05-05 PROCEDURE — 86780 TREPONEMA PALLIDUM: CPT

## 2022-05-05 RX ORDER — FERROUS SULFATE 325(65) MG
325 TABLET ORAL
Qty: 30 TABLET | Refills: 3 | Status: SHIPPED | OUTPATIENT
Start: 2022-05-05 | End: 2022-10-24

## 2022-05-05 NOTE — NURSING NOTE
Due to patient being non-English speaking/uses sign language, an  was used for this visit. Only for face-to-face interpretation by an external agency, date and length of interpretation can be found on the scanned worksheet.     name: Demarco Valentin  Agency: [employer]  Language: Con[place]  Telephone number: [phone]  Type of interpretation: Face-to-face, spoken

## 2022-05-05 NOTE — PROGRESS NOTES
Assessment & Plan  32 year old  at 29w5d with TERESITA 2022 based on 12 week US.    Dolly was seen today for prenatal care and questions about green card.    Diagnoses and all orders for this visit:    Supervision of other normal pregnancy, antepartum  -     Glucose tolerance gest screen 1 hour  -     Hemoglobin  -     Syphilis Screen Cascade (RPR/VDRL)  -     Hemoglobin  -     Syphilis Screen Cascade (RPR/VDRL)    No pregnancy concerns. Discussed kick counts and signs of labor. GCT pending, as well as hgb, syphillis. She is taking prenatal with iron daily. Had Tdap today.     Weight gain adequate: 11.8 kg (26 lb) to date, out of recommended total of 25-35 lbs (pregravid BMI 18.5-24.9)    Patient Instructions   Thank you for taking the time to discuss your health with me today!    Today we discussed:  1. Your pregnancy is progressing well! I will call you with lab results and we will see you back in 2 weeks.   2.   You received your tetanus booster today.  3.  Bring your paperwork next appointment and I will have social work try to assist you with following up on insurance.     As always, please call the clinic or message with any questions or concerns.     Best Wishes,  Filomena Manuel MD.         Return to clinic in 2 weeks.    Filomena Manuel MD  I precepted today with Savanna Booth MD.      Subjective  Concerns: Concerns about children's insurance cards. No pregnancy concerns.     ROS:  No - Headache  No - Changes in vision  No - Chest Pain  No - Shortness of Breath  No - Nausea   No - Vomiting  No - Abdominal pain   No - Contractions  No - Dysuria   No - Vaginal Discharge    No - Vaginal bleeding   No - Loss of Fluid   No - Extremity swelling   Present - Fetal movement       Going to WIC? Yes    Patient Active Problem List   Diagnosis     Supervision of high risk pregnancy, antepartum     Language barrier, cultural differences     Food insecurity     Financial difficulties     Encounter for health  examination of refugee       Dolly Rich speaks Karen[place]  so an  was used today.    Guidance:  fetal growth and movement  signs of  labor    Do you need help getting a car seat? No  Do you need help getting a breast pump? No    Objective  BP 97/65   Pulse 90   Temp 97.8  F (36.6  C)   Resp 16   Wt 61.7 kg (136 lb)   LMP 10/09/2021 (Approximate)   SpO2 (!) 84%   BMI 29.17 kg/m    No distress.  Gravid abdomen.  -154.  Fundal height 30 cm.  no edema.    Results  Blood type: O POS  No results found for any visits on 22.

## 2022-05-05 NOTE — PROGRESS NOTES
Preceptor attestation:  Vital signs reviewed: BP 97/65   Pulse 90   Temp 97.8  F (36.6  C)   Resp 16   Wt 61.7 kg (136 lb)   LMP 10/09/2021 (Approximate)   SpO2 (!) 84%   BMI 29.17 kg/m      Patient seen, evaluated, and discussed with the resident.  I have verified the content of the note, which accurately reflects my assessment of the patient and the plan of care.    Supervising physician: Savanna Booth MD  [facility]

## 2022-05-05 NOTE — PATIENT INSTRUCTIONS
Thank you for taking the time to discuss your health with me today!    Today we discussed:  Your pregnancy is progressing well! I will call you with lab results and we will see you back in 2 weeks.   2.   You received your tetanus booster today.  3.  Bring your paperwork next appointment and I will have social work try to assist you with following up on insurance.     As always, please call the clinic or message with any questions or concerns.     Best Wishes,  Filomena Manuel MD.

## 2022-05-06 LAB — T PALLIDUM AB SER QL: NONREACTIVE

## 2022-05-06 NOTE — PROGRESS NOTES
5/5/22 Requested car seat for pt online through [employer].  They will contact pt once they are ready to deliver to arrange time for delivery and instruct pt on how to use car seat (usually takes 3 weeks from the time referral received to get call for delivery).  Emailed request for breast pump to [facility] [employer].  Breast pump will be delivered to pt's home./NG

## 2022-05-26 ENCOUNTER — OFFICE VISIT (OUTPATIENT)
Dept: FAMILY MEDICINE | Facility: CLINIC | Age: 32
End: 2022-05-26
Payer: COMMERCIAL

## 2022-05-26 VITALS
BODY MASS INDEX: 29.82 KG/M2 | RESPIRATION RATE: 16 BRPM | HEART RATE: 99 BPM | WEIGHT: 139 LBS | DIASTOLIC BLOOD PRESSURE: 64 MMHG | TEMPERATURE: 98.3 F | SYSTOLIC BLOOD PRESSURE: 99 MMHG | OXYGEN SATURATION: 96 %

## 2022-05-26 DIAGNOSIS — Z34.80 SUPERVISION OF OTHER NORMAL PREGNANCY, ANTEPARTUM: Primary | ICD-10-CM

## 2022-05-26 PROCEDURE — 99207 PR PRENATAL VISIT: CPT | Mod: GC

## 2022-05-26 RX ORDER — SENNOSIDES 8.6 MG
1-2 TABLET ORAL DAILY PRN
Qty: 60 TABLET | Refills: 3 | Status: SHIPPED | OUTPATIENT
Start: 2022-05-26 | End: 2022-10-24

## 2022-05-26 NOTE — LETTER
[facility] [address] [place] [address]  Phone: [phone]  Fax: [phone]    May 26, 2022        Dolly Rich  [address]  [place] [address]          To Whom It May Concern:    RE: Dolly Rich is pregnant currently. She has an anticipated due date of 07/16/2022.    Please contact me for questions or concerns.      Sincerely,        Filomena Manuel MD

## 2022-05-26 NOTE — PROGRESS NOTES
Assessment & Plan  32 year old  at 32w5d with TERESITA 2022 based on 12 week US.     Dolly was seen today for prenatal care.    Diagnoses and all orders for this visit:    Supervision of other normal pregnancy, antepartum  -     sennosides (SENOKOT) 8.6 MG tablet; Take 1-2 tablets by mouth daily as needed for constipation    She has not started iron yet, so discussed the importance of this and wrote down the name for her.     She will follow up in approximately 2 weeks.    No other concerns today.     Weight gain adequate: 13.2 kg (29 lb) to date, out of recommended total of 25-35 lbs (pregravid BMI 18.5-24.9)    Patient Instructions   Thank you for taking the time to discuss your health with me today!    Today we discussed:  1. Start taking Ferrous Sulfate 325 mg every other day. This can make you constipated so take a stool softener if needed.   2.   We will follow up on your car seat and contact you.  3.  Call the clinic with questions or concerns.     As always, please call the clinic or message with any questions or concerns.     Best Wishes,  Filomena Manuel MD.         Return to clinic in 2 weeks.    Filomena Manuel MD  I precepted today with Jordi Bender MD.    Subjective  Concerns: Car seat not received yet. Questions about calling EMS.     ROS:  No - Headache  No - Changes in vision  No - Chest Pain  No - Shortness of Breath  No - Nausea   No - Vomiting  No - Abdominal pain   No - Contractions  No - Dysuria   No - Vaginal Discharge    No - Vaginal bleeding   No - Loss of Fluid   No - Extremity swelling   Present - Fetal movement       Going to WIC? Yes    Patient Active Problem List   Diagnosis     Supervision of high risk pregnancy, antepartum     Language barrier, cultural differences     Food insecurity     Financial difficulties     Encounter for health examination of refugee       Dolly Rich speaks Karen[place]  so an  was used today.    Guidance:  Signs of labor, when to go to the  hospital  fetal growth and movement  signs of  labor    Do you need help getting a car seat? YES  Do you need help getting a breast pump? No, she received her breast pump already      Objective  BP 99/64   Pulse 99   Temp 98.3  F (36.8  C) (Oral)   Resp 16   Wt 63 kg (139 lb)   LMP 10/09/2021 (Approximate)   SpO2 96%   BMI 29.82 kg/m    No distress.  Gravid abdomen.  's-150's.  Fundal height 32 cm.  no edema.    Results  Blood type: O POS  No results found for any visits on 22.

## 2022-05-26 NOTE — PATIENT INSTRUCTIONS
Thank you for taking the time to discuss your health with me today!    Today we discussed:  Start taking Ferrous Sulfate 325 mg every other day. This can make you constipated so take a stool softener if needed.   2.   We will follow up on your car seat and contact you.  3.  Call the clinic with questions or concerns.     As always, please call the clinic or message with any questions or concerns.     Best Wishes,  Filomena Manuel MD.

## 2022-05-26 NOTE — NURSING NOTE
Due to patient being non-English speaking/uses sign language, an  was used for this visit. Only for face-to-face interpretation by an external agency, date and length of interpretation can be found on the scanned worksheet.     name: Demarco Valentin  Agency: [employer]  Language: [place]   Telephone number: [phone]  Type of interpretation: Face-to-face, spoken

## 2022-06-07 ENCOUNTER — OFFICE VISIT (OUTPATIENT)
Dept: FAMILY MEDICINE | Facility: CLINIC | Age: 32
End: 2022-06-07
Payer: COMMERCIAL

## 2022-06-07 VITALS
WEIGHT: 140.8 LBS | RESPIRATION RATE: 16 BRPM | HEART RATE: 91 BPM | DIASTOLIC BLOOD PRESSURE: 63 MMHG | SYSTOLIC BLOOD PRESSURE: 98 MMHG | BODY MASS INDEX: 30.2 KG/M2 | TEMPERATURE: 97.2 F | OXYGEN SATURATION: 98 %

## 2022-06-07 DIAGNOSIS — Z34.80 SUPERVISION OF OTHER NORMAL PREGNANCY, ANTEPARTUM: Primary | ICD-10-CM

## 2022-06-07 PROCEDURE — 99207 PR PRENATAL VISIT: CPT | Mod: GC

## 2022-06-07 NOTE — PATIENT INSTRUCTIONS
Thank you for taking the time to discuss your health with me today!    Today we discussed:  Your pregnancy is progressing well. Baby appears to be growing perfectly.  2.   Call [facility] to obtain your crib and baby kit.  3.   Follow up with Dr. Carrasco in 2 weeks.     As always, please call the clinic or message with any questions or concerns.     Best Wishes,  Filomena Manuel MD.

## 2022-06-07 NOTE — PROGRESS NOTES
Preceptor Attestation:    I discussed the patient with the resident and evaluated the patient in person. I have verified the content of the note, which accurately reflects my assessment of the patient and the plan of care.   Supervising Physician:  Nathan Fallon MD.

## 2022-06-07 NOTE — PROGRESS NOTES
Assessment & Plan  32 year old  at 34w3d with TERESITA 2022 based on 12 week US. Pregnancy has been uncomplicated to this point. 3rd trimester Hgb 11.2. Last US was 20 weeks and no concerns identified. Only complicated by social factors: recent immigrant in , limited resources financially.     Dolly Rich was seen for routine prenatal care visit.     1. Routine OB  Doing well. Measuring appropriately.  - Return in 2 weeks, I am on nights so plan to see Dr. Carrasco.  - GBS swab at that time.   - Weekly visits after that.  - Discussed PIH sx, plans to do Depo PP at this point  - Discussed car seat-she has this delivered already  - Hand out for [facility] given so she can obtain crib, and a few more supplies  - Pinky gave diapers, clothes, and a few bottles today.     Weight gain adequate: 14 kg (30 lb 12.8 oz) to date, out of recommended total of 25-35 lbs (pregravid BMI 18.5-24.9)    Patient Instructions   Thank you for taking the time to discuss your health with me today!    Today we discussed:  1. Your pregnancy is progressing well. Baby appears to be growing perfectly.  2.   Call [facility] to obtain your crib and baby kit.  3.   Follow up with Dr. Carrasco in 2 weeks.     As always, please call the clinic or message with any questions or concerns.     Best Wishes,  Filomena Manuel MD.         Return to clinic in 2 weeks.    Filomena Manuel MD  I precepted today with Nathan Fallon MD.    Subjective  Concerns: No concerns. Having some intermittent leg swelling but goes away with putting her feet.     ROS:  No - Headache  No - Changes in vision  No - Chest Pain  No - Shortness of Breath  No - Nausea   No - Vomiting  No - Abdominal pain   No - Contractions  No - Dysuria   No - Vaginal Discharge    No - Vaginal bleeding   No - Loss of Fluid   YES - Extremity swelling   Present - Fetal movement     Going to WIC? Yes    Patient Active Problem List   Diagnosis     Supervision of high risk pregnancy, antepartum      Language barrier, cultural differences     Food insecurity     Financial difficulties     Encounter for health examination of refugee       Dolly Rich speaks Karen[place]  so an  was used today.    Guidance:    birth control->leaning towards Depo at this point  Travel  Warning signs/PIH    Do you need help getting a car seat? No-she has delivered to her home already  Do you need help getting a breast pump? No-Has it already    Objective  BP 98/63 (BP Location: Left arm, Patient Position: Sitting, Cuff Size: Adult Regular)   Pulse 91   Temp 97.2  F (36.2  C) (Oral)   Resp 16   Wt 63.9 kg (140 lb 12.8 oz)   LMP 10/09/2021 (Approximate)   SpO2 98%   BMI 30.20 kg/m    No distress.  Gravid abdomen.  -144.  Fundal height 33 cm.  trace edema.    Results  Blood type: O POS  No results found for any visits on 06/07/22.

## 2022-06-21 ENCOUNTER — OFFICE VISIT (OUTPATIENT)
Dept: FAMILY MEDICINE | Facility: CLINIC | Age: 32
End: 2022-06-21
Payer: COMMERCIAL

## 2022-06-21 VITALS
HEART RATE: 80 BPM | BODY MASS INDEX: 30.51 KG/M2 | DIASTOLIC BLOOD PRESSURE: 65 MMHG | SYSTOLIC BLOOD PRESSURE: 99 MMHG | OXYGEN SATURATION: 99 % | HEIGHT: 57 IN | RESPIRATION RATE: 20 BRPM | TEMPERATURE: 97.9 F | WEIGHT: 141.4 LBS

## 2022-06-21 DIAGNOSIS — Z34.80 SUPERVISION OF OTHER NORMAL PREGNANCY, ANTEPARTUM: Primary | ICD-10-CM

## 2022-06-21 PROCEDURE — 91305 COVID-19,PF,PFIZER (12+ YRS): CPT

## 2022-06-21 PROCEDURE — 99207 PR PRENATAL VISIT: CPT | Mod: GC

## 2022-06-21 PROCEDURE — 87653 STREP B DNA AMP PROBE: CPT

## 2022-06-21 PROCEDURE — 0054A COVID-19,PF,PFIZER (12+ YRS): CPT

## 2022-06-21 NOTE — PATIENT INSTRUCTIONS
Thank you for coming to see me today in clinic!    Today we discussed:  - Your GBS swab. Results will be back next week.  - Your next appointment will be next week.    If you have any further questions, please call the clinic!    Have a wonderful rest of your day!

## 2022-06-21 NOTE — NURSING NOTE
Due to patient being non-English speaking/uses sign language, an  was used for this visit. Only for face-to-face interpretation by an external agency, date and length of interpretation can be found on the scanned worksheet.     name: EDEL VALLES   Agency: [employer]  Language: BETH[place]   Telephone number:   Type of interpretation: Face-to-face, spoken

## 2022-06-21 NOTE — PROGRESS NOTES
Return OB    Assessment & Plan  32 year old  at 36w3d with TERESITA 2022 based on 12 week ultrasounds. Pregnancy has been uncomplicated to this point. 3rd trimester Hgb 11.2. Last US was 20 weeks and no concerns identified. Only complicated by social factors: recent immigrant in , limited resources financially.      Diagnoses and all orders for this visit:    Supervision of other normal pregnancy, antepartum  -     Group B strep PCR  - Still thinking about having Depo postpartum for birth control but is unsure.  - Pfizer COVID-19 booster given      Weight gain adequate: 14.2 kg (31 lb 6.4 oz) to date, out of recommended total of 25-35 lbs (pregravid BMI 18.5-24.9)    There are no Patient Instructions on file for this visit.    Return to clinic in 1 week.      I precepted today with Tom Manuel MD.    Tamiko Carrasco MD PGY1  [facility]    Subjective  Concerns: groin pain with standing and moving in and out of bed    ROS:  No - Headache  No - Changes in vision  No - Chest Pain  No - Shortness of Breath  No - Nausea   No - Vomiting  No - Abdominal pain   No - Contractions  No - Dysuria   No - Vaginal Discharge    No - Vaginal bleeding   No - Loss of Fluid   No - Extremity swelling - none today, will come and go  Present - Fetal movement       Going to WIC? Yes    Patient Active Problem List   Diagnosis     Supervision of high risk pregnancy, antepartum     Language barrier, cultural differences     Food insecurity     Financial difficulties     Encounter for health examination of refugee       Dolly Rich speaks Karen[place]  so an  was used today.    Guidance:   birth control  warning signs/PIH    Do you need help getting a car seat? No-she has delivered to her home already  Do you need help getting a breast pump? No-Has it already    Objective  BP 99/65 (BP Location: Left arm, Cuff Size: Adult Regular)   Pulse 80   Temp 97.9  F (36.6  C) (Oral)   Resp 20   Ht 1.456 m (4'  "9.32\")   Wt 64.1 kg (141 lb 6.4 oz)   LMP 10/09/2021 (Approximate)   SpO2 99%   BMI 30.25 kg/m    No distress.  Gravid abdomen.  .  Fundal height 35.5 cm.  No edema.  Cephalic presentation via Leopold maneuver.    Results  Blood type: O POS  No results found for any visits on 06/21/22.  "

## 2022-06-22 LAB — GP B STREP DNA SPEC QL NAA+PROBE: NEGATIVE

## 2022-06-29 NOTE — PROGRESS NOTES
Preceptor Attestation:    I discussed the patient with the resident and evaluated the patient in person. I have verified the content of the note, which accurately reflects my assessment of the patient and the plan of care.   Supervising Physician:  Tom Manuel MD.

## 2022-07-01 ENCOUNTER — OFFICE VISIT (OUTPATIENT)
Dept: FAMILY MEDICINE | Facility: CLINIC | Age: 32
End: 2022-07-01
Payer: COMMERCIAL

## 2022-07-01 VITALS
WEIGHT: 144.8 LBS | SYSTOLIC BLOOD PRESSURE: 95 MMHG | RESPIRATION RATE: 16 BRPM | DIASTOLIC BLOOD PRESSURE: 64 MMHG | TEMPERATURE: 97.7 F | HEART RATE: 86 BPM | BODY MASS INDEX: 30.98 KG/M2 | OXYGEN SATURATION: 97 %

## 2022-07-01 DIAGNOSIS — M25.552 HIP PAIN, LEFT: Primary | ICD-10-CM

## 2022-07-01 PROCEDURE — 99207 PR PRENATAL VISIT: CPT | Mod: GC

## 2022-07-01 RX ORDER — ACETAMINOPHEN 325 MG/1
650 TABLET ORAL EVERY 6 HOURS PRN
Qty: 60 TABLET | Refills: 0 | Status: SHIPPED | OUTPATIENT
Start: 2022-07-01 | End: 2023-08-28

## 2022-07-01 NOTE — NURSING NOTE
Due to patient being non-English speaking/uses sign language, an  was used for this visit. Only for face-to-face interpretation by an external agency, date and length of interpretation can be found on the scanned worksheet.     name: Demarco Valentin  Agency: [employer]  Language: [place]  Telephone number: [phone]  Type of interpretation: Face-to-face, spoken

## 2022-07-01 NOTE — PATIENT INSTRUCTIONS
Delivery Plan     [facility]      Take me to: [facility]  Phone number: [phone]  [facility] phone number: [phone]    My name is: Dolly Rich  : 1990    My Doctor is: Filomena Manuel MD   Attending Physician: [facility]  Prenatal care was at [facility] [phone].    32 year old         -para:   Estimated Date of Delivery: 2022  At 37w6d on 2022  Delivery type: Vaginal Delivery    Patient Active Problem List   Diagnosis    Supervision of high risk pregnancy, antepartum    Language barrier, cultural differences    Food insecurity    Financial difficulties    Encounter for health examination of refugee     Allergies:No Known Allergies    Lab Results:  Blood Type: O POS  GBS:negative Date completed: 22    No results found for: N9LK  No results found for: LCN7  No results found for: N5UV  No results found for: N9LF  Hemoglobin   Date Value Ref Range Status   2022 11.3 (L) 11.7 - 15.7 g/dL Final   2021 13.5 11.7 - 15.7 g/dL Final       Last cervical check: 2022 Cervical Dilation: Deferred, Effacement:  Deferred    Immunization History   Administered Date(s) Administered    COVID-19,PF,Pfizer (12+ Yrs) 10/27/2021, 2021    COVID-19,PF,Pfizer 12+ Yrs ( and After) 2022    HepB, Unspecified 2019, 2019, 10/15/2020    Hepatitis A Immunity: Titer/MD Dx 2021    Hepatitis B Immunity: Titer 2021    Influenza, Intradermal, Quad, Pf 2019, 10/15/2020, 2021    MMR 2019, 2019    Td (Adult), Adsorbed 2019, 2019    Tdap (Adacel,Boostrix) 10/27/2021, 2022    Varicella Immunity: Titer/MD Dx 2021         Immunizations needed postpartum: None    Who will be present at the delivery?  will be home with Children    Do you have a ?No If no, would you like one? No     What are your plans for  pain control?Natural  Open to options    Who will cut the umbilical cord? Undecided    Do you plan to breastfeed?Yes    If your baby is a boy, would you like him circumcised?N/A    Name of baby's clinic: [facility]    Would you like your baby to have the recommended vitamin K shot to prevent bleeding, Hepatitis B shot, and eye ointment to prevent eye infections?    Next Appointment is: 1 week

## 2022-07-08 ENCOUNTER — OFFICE VISIT (OUTPATIENT)
Dept: FAMILY MEDICINE | Facility: CLINIC | Age: 32
End: 2022-07-08
Payer: COMMERCIAL

## 2022-07-08 DIAGNOSIS — O09.90 SUPERVISION OF HIGH RISK PREGNANCY, ANTEPARTUM: Primary | ICD-10-CM

## 2022-07-08 PROCEDURE — 59426 ANTEPARTUM CARE ONLY: CPT | Mod: GC | Performed by: STUDENT IN AN ORGANIZED HEALTH CARE EDUCATION/TRAINING PROGRAM

## 2022-07-08 NOTE — NURSING NOTE
Due to patient being non-English speaking/uses sign language, an  was used for this visit. Only for face-to-face interpretation by an external agency, date and length of interpretation can be found on the scanned worksheet.     name:           ID:   Agency: [facility] [employer] Phone [employer]preting  Language: Conce[place]   Telephone number: [phone]  Type of interpretation: Telephone, spoken

## 2022-07-08 NOTE — PATIENT INSTRUCTIONS
Patient Instructions      Delivery Plan      I NEED A [place]       Take me to: [facility]  Phone number: [phone]  [facility] phone number: [phone]     My name is: Dolly Rich  : 1990     My Doctor is: Filomena Manuel MD   Attending Physician: [facility]  Prenatal care was at [facility] [phone].     32 year old                                                                                -para:   Estimated Date of Delivery: 2022  At 37w6d on 2022  Delivery type: Vaginal Delivery         Patient Active Problem List   Diagnosis    Supervision of high risk pregnancy, antepartum    Language barrier, cultural differences    Food insecurity    Financial difficulties    Encounter for health examination of refugee      Allergies:No Known Allergies     Lab Results:  Blood Type: O POS  GBS:negative Date completed: 22     No results found for: N9LK  No results found for: LCN7  No results found for: N5UV  No results found for: N9LF        Hemoglobin   Date Value Ref Range Status   2022 11.3 (L) 11.7 - 15.7 g/dL Final   2021 13.5 11.7 - 15.7 g/dL Final         Last cervical check: 2022 Cervical Dilation: Deferred, Effacement:  Deferred          Immunization History   Administered Date(s) Administered    COVID-19,PF,Pfizer (12+ Yrs) 10/27/2021, 2021    COVID-19,PF,Pfizer 12+ Yrs ( and After) 2022    HepB, Unspecified 2019, 2019, 10/15/2020    Hepatitis A Immunity: Titer/MD Dx 2021    Hepatitis B Immunity: Titer 2021    Influenza, Intradermal, Quad, Pf 2019, 10/15/2020, 2021    MMR 2019, 2019    Td (Adult), Adsorbed 2019, 2019    Tdap (Adacel,Boostrix) 10/27/2021, 2022    Varicella Immunity: Titer/MD Dx 2021            Immunizations needed postpartum: None     Who will be present at the  delivery?  will be home with Children     Do you have a ?No If no, would you like one? No      What are your plans for pain control?Natural  Open to options     Who will cut the umbilical cord? Undecided     Do you plan to breastfeed?Yes     If your baby is a boy, would you like him circumcised?N/A     Name of baby's clinic: [facility]     Would you like your baby to have the recommended vitamin K shot to prevent bleeding, Hepatitis B shot, and eye ointment to prevent eye infections?     Next Appointment is: 1 week

## 2022-07-08 NOTE — PROGRESS NOTES
Assessment & Plan  32 year old  at 38w6d with TERESITA 2022 based on 12 week ultrasound. GBS negative.     Dolly was seen today for prenatal care and pain.    Diagnoses and all orders for this visit:    Supervision of high risk pregnancy, antepartum    Doing well today, concerned about ride to hospital when she goes into labor as she does not know anyone who is able to drive. We discussed options: 1) contacting refugee coordinator to see if they would know of options, 2) discuss scheduling elective induction next week then we would be able to schedule her a ride, or 3) call 911 if she does not have any other options - though ambulance would likely not take her to [facility].     Weight gain adequate: 15.7 kg (34 lb 9.6 oz) to date, out of recommended total of 25-35 lbs (pregravid BMI 18.5-24.9)    There are no Patient Instructions on file for this visit.    Return to clinic in 1 week.    Benita Kay Behm, MD  I precepted today with Lilia Mann MD.    30 minutes spent on the date of the encounter doing chart review, history and exam, documentation and further activities per the note    Subjective  Concerns: She is concerned about transportation to the hospital when she goes into labor. No other concerns at this time.     ROS:  No - Headache  No - Changes in vision  No - Chest Pain  No - Shortness of Breath  No - Nausea   No - Vomiting  No - Abdominal pain   No - Contractions  No - Dysuria   No - Vaginal Discharge    No - Vaginal bleeding   No - Loss of Fluid   No - Extremity swelling   Present - Fetal movement       Going to WIC? Yes    Patient Active Problem List   Diagnosis     Supervision of high risk pregnancy, antepartum     Language barrier, cultural differences     Food insecurity     Financial difficulties     Encounter for health examination of refugee       Dolly Rich speaks [place]  so an  was used today.    Guidance:  post-partum care  GBS  signs of labor  pain control during  labor    Do you need help getting a car seat? No  Do you need help getting a breast pump? No    Objective  BP 94/68   Pulse 84   Temp 98.4  F (36.9  C) (Oral)   Resp 20   Wt 65.6 kg (144 lb 9.6 oz)   LMP 10/09/2021 (Approximate)   SpO2 100%   BMI 30.94 kg/m    No distress.  Gravid abdomen.  .  Fundal height 37 cm.  trace edema.  Cervix: not examined    Results  Blood type: O POS  No results found for any visits on 07/08/22.

## 2022-07-08 NOTE — PROGRESS NOTES
Preceptor Attestation:  I discussed the patient with the resident and evaluated the patient in person. I have verified the content of the note, which accurately reflects my assessment of the patient and the plan of care.  Supervising Physician:  Filomena Mann MD.

## 2022-07-22 PROBLEM — Z59.41 FOOD INSECURITY: Status: RESOLVED | Noted: 2021-12-23 | Resolved: 2022-07-22

## 2022-07-22 PROBLEM — Z59.9 FINANCIAL DIFFICULTIES: Status: RESOLVED | Noted: 2021-12-23 | Resolved: 2022-07-22

## 2022-07-22 PROBLEM — O09.90 SUPERVISION OF HIGH RISK PREGNANCY, ANTEPARTUM: Status: RESOLVED | Noted: 2021-12-23 | Resolved: 2022-07-22

## 2022-07-26 VITALS
SYSTOLIC BLOOD PRESSURE: 94 MMHG | RESPIRATION RATE: 20 BRPM | DIASTOLIC BLOOD PRESSURE: 68 MMHG | HEART RATE: 84 BPM | TEMPERATURE: 98.4 F | OXYGEN SATURATION: 100 % | BODY MASS INDEX: 30.94 KG/M2 | WEIGHT: 144.6 LBS

## 2022-07-29 ENCOUNTER — TELEPHONE (OUTPATIENT)
Dept: FAMILY MEDICINE | Facility: CLINIC | Age: 32
End: 2022-07-29

## 2022-07-29 NOTE — TELEPHONE ENCOUNTER
Called pt with [facility] [place] [phone]  ABOUT BABY: Dolores Donato 7/10/22   1) How is feeding going?Breast feeding every 1-2 hours and giving 2 bottles of formula per day (once in am and once in pm)    2) Do you have the things you need to take care of your baby? Would like assistance with getting a crib.  Pt is unable to go to [facility] as she has no one English speaking that can go with her.  PHN referral placed.    3) Any change in urination, stooling, or skin color? 5-6 wet diapers, 6-7 stools, and skin is not yellow.    4) Any other concerns you have about your baby? None, other than the couple days the baby has been crying more.  Mom denies fever, cough, and runny nose.  Discussed symptoms to watch for and reasons to call the clinic.     C APPT: Wed, 8/3/22 with Dr Brewer        ABOUT MOM:  Dolly Mo [birth year]  1) Any concerns about bleeding, stooling, urination, or abdominal pain? Vaginal bleeding has decreased, voiding without difficulty, having constipation: passes hard stool every 2-3 days.  Pt states that she is out of the Senna.  Called [facility] to request refill and pt can  today.  Pt denies abd pain.     2) How is your mood and how are you coping? Mood is good.    3) What is your plan for contraception?  Unsure, Recommended a visit at 6 weeks to do postpartum visit and discuss contraception options with your physician.    Then we would be able to start, inject or place contraception at timing of 2month check for baby.  Reminded mom that she MUST abstain from intercourse or use condoms until this visit so she would be eligible for contraception at time of 2 month visit for baby.      6 wk PP APPT: Monday, 8/15/22 at 2:10 PM with Dr Manuel./RAFI

## 2022-08-15 ENCOUNTER — OFFICE VISIT (OUTPATIENT)
Dept: FAMILY MEDICINE | Facility: CLINIC | Age: 32
End: 2022-08-15
Payer: COMMERCIAL

## 2022-08-15 VITALS
TEMPERATURE: 98.5 F | SYSTOLIC BLOOD PRESSURE: 113 MMHG | HEART RATE: 82 BPM | BODY MASS INDEX: 27.64 KG/M2 | DIASTOLIC BLOOD PRESSURE: 73 MMHG | OXYGEN SATURATION: 97 % | RESPIRATION RATE: 16 BRPM | WEIGHT: 129.2 LBS

## 2022-08-15 LAB — HCG UR QL: NEGATIVE

## 2022-08-15 PROCEDURE — 81025 URINE PREGNANCY TEST: CPT

## 2022-08-15 PROCEDURE — 99213 OFFICE O/P EST LOW 20 MIN: CPT | Mod: 25

## 2022-08-15 PROCEDURE — 96372 THER/PROPH/DIAG INJ SC/IM: CPT | Performed by: FAMILY MEDICINE

## 2022-08-15 RX ORDER — MEDROXYPROGESTERONE ACETATE 150 MG/ML
150 INJECTION, SUSPENSION INTRAMUSCULAR
Status: COMPLETED | OUTPATIENT
Start: 2022-08-15 | End: 2023-05-15

## 2022-08-15 RX ADMIN — MEDROXYPROGESTERONE ACETATE 150 MG: 150 INJECTION, SUSPENSION INTRAMUSCULAR at 15:18

## 2022-08-15 NOTE — NURSING NOTE
Clinic Administered Medication Documentation    Administrations This Visit     medroxyPROGESTERone (DEPO-PROVERA) injection 150 mg     Admin Date  08/15/2022 Action  Given Dose  150 mg Route  Intramuscular Site  Left Deltoid Administered By  Renetta Degroot CMA    Ordering Provider: Sukumar Tobias MD    NDC: 67583-412-30    Lot#: [id number]    : MYLAN [facility]    Patient Supplied?: No                  Depo Provera Documentation    URINE HCG: negative    Depo-Provera Standing Order inclusion/exclusion criteria reviewed.   Patient meets: inclusion criteria     BP: 113/73  LAST PAP/EXAM: No results found for: PAP    Prior to injection, verified patient identity using patient's name and date of birth. Medication was administered. Please see MAR and medication order for additional information.     Was entire vial of medication used? Yes  Vial/Syringe: Single dose vial  Expiration Date:  01/30/2024    Patient instructed to remain in clinic for 15 minutes.  NEXT INJECTION DUE: 11/1/22 - 11/15/22      Name of provider who requested the medication administration: Dr. Manuel  Name of provider on site (faculty or community preceptor) at the time of performing the medication administration: Dr. Sin    Date of next administration: 11/1/22 - 11/15/22  Date of next office visit with provider to renew medication plan (must be seen annually): 08/15/2022

## 2022-08-15 NOTE — PATIENT INSTRUCTIONS
Thank you for taking the time to discuss your health with me today!    Today we discussed:  Your post partum visit. You appear to be recovering well. We will start Depo Injection for birth control. Please follow up in 3 months for your next injection.  2.  Please follow up if you have any questions or concerns.     As always, please call the clinic or message with any questions or concerns.     Best Wishes,  Filomena Manuel MD.

## 2022-08-15 NOTE — PROGRESS NOTES
Preceptor Attestation:    I discussed the patient with the resident and evaluated the patient in person. I have verified the content of the note, which accurately reflects my assessment of the patient and the plan of care.   Supervising Physician:  Sukumar Tobias MD.

## 2022-08-15 NOTE — PROGRESS NOTES
Assessment & Plan:    1. Post Partum Follow Up  Doing well. Would like to start Depo Injection again. UPT negative in clinic. She has not resumed intercourse.  - Depo Injection every 3 months  - Follow up as needed    Subjective   Dolly is a 32 year old  presenting for 6 week post partum visit.  She has been feeling well, she has no concerns.  Her vaginal bleeding has become minimal and intermittent.  She is otherwise feeling well and basically back to her baseline.  She is intermittently breast-feeding and formula feeding.  She is wondering about birth control options today.  She had a routine vaginal delivery at [facility].  Her baby is doing well.    Post Partum Exam      Review of Systems   Constitutional, HEENT, cardiovascular, pulmonary, gi and gu systems are negative, except as otherwise noted.      Objective    /73 (BP Location: Left arm, Patient Position: Sitting, Cuff Size: Adult Regular)   Pulse 82   Temp 98.5  F (36.9  C) (Oral)   Resp 16   Wt 58.6 kg (129 lb 3.2 oz)   SpO2 97%   BMI 27.64 kg/m    Body mass index is 27.64 kg/m .  Physical Exam   GENERAL: healthy, alert and no distress  RESP: lungs clear to auscultation - no rales, rhonchi or wheezes  BREAST: No masses, non tender, no evidence of cracking or bleeding nipples  CV: regular rate and rhythm, normal S1 S2  ABDOMEN: soft, nontender, no hepatosplenomegaly, no masses and bowel sounds normal    (female): deferred, no laceration reported, declined pelvic exam  MS: no gross musculoskeletal defects noted, no edema  SKIN: no suspicious lesions or rashes  PSYCH: mentation appears normal, affect normal/bright    I precepted today with Dr. Tobias.    Filomena Manuel MD PGY-2            .  ..

## 2022-08-15 NOTE — NURSING NOTE
Due to patient being non-English speaking/uses sign language, an  was used for this visit. Only for face-to-face interpretation by an external agency, date and length of interpretation can be found on the scanned worksheet.     name: [id number]  Agency:  [facility] [employer]    Language: [place]   Telephone number: [phone]  Type of interpretation: Telephone, spoken

## 2022-09-22 ENCOUNTER — MEDICAL CORRESPONDENCE (OUTPATIENT)
Dept: HEALTH INFORMATION MANAGEMENT | Facility: CLINIC | Age: 32
End: 2022-09-22

## 2022-09-30 ENCOUNTER — MEDICAL CORRESPONDENCE (OUTPATIENT)
Dept: HEALTH INFORMATION MANAGEMENT | Facility: CLINIC | Age: 32
End: 2022-09-30

## 2022-10-24 ENCOUNTER — OFFICE VISIT (OUTPATIENT)
Dept: FAMILY MEDICINE | Facility: CLINIC | Age: 32
End: 2022-10-24
Payer: COMMERCIAL

## 2022-10-24 VITALS
BODY MASS INDEX: 27.94 KG/M2 | HEIGHT: 57 IN | WEIGHT: 129.5 LBS | TEMPERATURE: 98.2 F | RESPIRATION RATE: 12 BRPM | HEART RATE: 71 BPM | DIASTOLIC BLOOD PRESSURE: 66 MMHG | SYSTOLIC BLOOD PRESSURE: 100 MMHG | OXYGEN SATURATION: 99 %

## 2022-10-24 DIAGNOSIS — Z60.3 LANGUAGE BARRIER, CULTURAL DIFFERENCES: ICD-10-CM

## 2022-10-24 DIAGNOSIS — Z23 ENCOUNTER FOR IMMUNIZATION: Primary | ICD-10-CM

## 2022-10-24 DIAGNOSIS — Z59.82 TRANSPORTATION UNAVAILABLE: ICD-10-CM

## 2022-10-24 PROCEDURE — 99214 OFFICE O/P EST MOD 30 MIN: CPT | Performed by: FAMILY MEDICINE

## 2022-10-24 RX ORDER — ACETAMINOPHEN 325 MG/1
325-650 TABLET ORAL
COMMUNITY
Start: 2022-07-10 | End: 2023-08-28

## 2022-10-24 RX ORDER — IBUPROFEN 200 MG
TABLET ORAL
COMMUNITY
Start: 2022-07-10 | End: 2023-08-28

## 2022-10-24 RX ORDER — B-COMPLEX WITH VITAMIN C
TABLET ORAL
COMMUNITY
Start: 2022-05-03 | End: 2023-08-28

## 2022-10-24 RX ORDER — CALCIUM CARBONATE 500(1250)
TABLET,CHEWABLE ORAL
COMMUNITY
Start: 2022-05-03 | End: 2023-08-28

## 2022-10-24 SDOH — ECONOMIC STABILITY - TRANSPORTATION SECURITY: TRANSPORTATION INSECURITY: Z59.82

## 2022-10-24 SDOH — SOCIAL STABILITY - SOCIAL INSECURITY: ACCULTURATION DIFFICULTY: Z60.3

## 2022-10-24 NOTE — PROGRESS NOTES
"Assessment/Plan:        Diagnoses and associated orders for this visit:      History and physical examination, immigration- Immunizations reviewed and updated.  [place] I-693 completed and given to pt in sealed envelope.  Copy scanned into chart, copy given to pt for their records.  RTC to PCP for routine preventive care in one year, sooner prn.       Pt needs Recordant - there was none available for the the initial part of her visit and a Jamaican phone  was used. She doesn't understand Jamaican very well and couldn't understand about  Half of what he said. We did have a Recordant  for the second half of the visit.    SHe doesn't have any friends or family members that can speak English.  Doesn't have anyone helping her with her green card application.  I gave her the phone number and address for [employer] and told her she can call and say \"[place]\" and they should be able to get someone to help her in her language.  I explained that she can't open the envelope I gave to her because she will need to send it in with her green card application.      Someone from our clinic called and told her that transportation was arranged for her visit today- no one came to pick her up so he had to ask people in her apartment to bring her.  I will follow this up with our .      38 minutes spent on pre-charting and appointment.   Subjective:    Patient ID: Dolly Rich is a 32 year old female    HPI Pt is here for a green card exam, came to [place] as a refugee.  No concerns about her health.     The following portions of the patient's history were reviewed and updated as appropriate: allergies, current medications, past family history, past medical history, past social history, past surgical history and problem list.    Review of Systems      Neg other than HPI.     Objective:    Physical Exam          Patient is in no apparent physical distress.  Vitals are as recorded.  Head and face are " normal.  Conjunctiva are clear.  Extremities are without edema.  Gait is normal.  Skin is without rashes.  Mood and affect are appropriate.

## 2022-11-15 ENCOUNTER — ALLIED HEALTH/NURSE VISIT (OUTPATIENT)
Dept: FAMILY MEDICINE | Facility: CLINIC | Age: 32
End: 2022-11-15
Payer: COMMERCIAL

## 2022-11-15 VITALS
DIASTOLIC BLOOD PRESSURE: 70 MMHG | TEMPERATURE: 98.1 F | BODY MASS INDEX: 28.08 KG/M2 | WEIGHT: 131.2 LBS | OXYGEN SATURATION: 98 % | HEART RATE: 62 BPM | SYSTOLIC BLOOD PRESSURE: 104 MMHG

## 2022-11-15 DIAGNOSIS — Z30.013 ENCOUNTER FOR INITIAL PRESCRIPTION OF INJECTABLE CONTRACEPTIVE: Primary | ICD-10-CM

## 2022-11-15 PROCEDURE — 96372 THER/PROPH/DIAG INJ SC/IM: CPT

## 2022-11-15 PROCEDURE — 99207 PR NO BILLABLE SERVICE THIS VISIT: CPT

## 2022-11-15 RX ADMIN — MEDROXYPROGESTERONE ACETATE 150 MG: 150 INJECTION, SUSPENSION INTRAMUSCULAR at 08:37

## 2022-11-15 NOTE — NURSING NOTE
Clinic Administered Medication Documentation    Administrations This Visit     medroxyPROGESTERone (DEPO-PROVERA) injection 150 mg     Admin Date  11/15/2022 Action  Given Dose  150 mg Route  Intramuscular Site  Left Deltoid Administered By  Michel Domingo CMA    Ordering Provider: Sukumar Tobias MD    NDC: 81853-698-87    Lot#: [id number]    : [facility]    Patient Supplied?: No    Comments: Reminder card given to pt. Next depo due 2/1/23-2/15/23                  Depo Provera Documentation    URINE HCG: not indicated    Depo-Provera Standing Order inclusion/exclusion criteria reviewed.   Patient meets: inclusion criteria     BP: 104/70  LAST PAP/EXAM: No results found for: PAP    Prior to injection, verified patient identity using patient's name and date of birth. Medication was administered. Please see MAR and medication order for additional information.     Was entire vial of medication used? Yes  Vial/Syringe: Single dose vial LOT# [id number]  Expiration Date:  4/2024    Patient instructed to remain in clinic for 15 minutes and report any adverse reaction to staff immediately .  NEXT INJECTION DUE: 2/1/23 - 2/15/23      Name of provider who requested the medication administration: Dr. Manuel  Name of provider on site (faculty or community preceptor) at the time of performing the medication administration: Dr. Manuel    Date of next administration: 2/1/23-2/15/23  Date of next office visit with provider to renew medication plan (must be seen annually): 8/15/23

## 2022-12-16 NOTE — PROGRESS NOTES
Assessment & Plan  32 year old  at 37w6d with TERESITA 2022 based on 12 week US, GBS negative.     32 year old  at 34w3d with TERESITA 2022 based on 12 week US. Pregnancy has been uncomplicated to this point. 3rd trimester Hgb 11.2. Last US was 20 weeks and no concerns identified. Only complicated by social factors: recent immigrant in , limited resources financially.     Dolly was seen today for prenatal care.    Diagnoses and all orders for this visit:    Hip pain, left  -     acetaminophen (TYLENOL) 325 MG tablet; Take 2 tablets (650 mg) by mouth every 6 hours as needed for mild pain        Weight gain adequate: 15.8 kg (34 lb 12.8 oz) to date, out of recommended total of 25-35 lbs (pregravid BMI 18.5-24.9)    Patient Instructions     Delivery Plan     I NEED A Spark      Take me to: [facility]  Phone number: [phone]  [facility] phone number: [phone]    My name is: Dolly Rich  : 1990    My Doctor is: Filomena Manuel MD   Attending Physician: [facility]  Prenatal care was at [facility] [phone].    32 year old         -para:   Estimated Date of Delivery: 2022  At 37w6d on 2022  Delivery type: Vaginal Delivery    Patient Active Problem List   Diagnosis     Supervision of high risk pregnancy, antepartum     Language barrier, cultural differences     Food insecurity     Financial difficulties     Encounter for health examination of refugee     Allergies:No Known Allergies    Lab Results:  Blood Type: O POS  GBS:negative Date completed: 22    No results found for: N9LK  No results found for: LCN7  No results found for: N5UV  No results found for: N9LF  Hemoglobin   Date Value Ref Range Status   2022 11.3 (L) 11.7 - 15.7 g/dL Final   2021 13.5 11.7 - 15.7 g/dL Final       Last cervical check: 2022 Cervical Dilation: Deferred, Effacement:   Deferred    Immunization History   Administered Date(s) Administered     COVID-19,PF,Pfizer (12+ Yrs) 10/27/2021, 12/07/2021     COVID-19,PF,Pfizer 12+ Yrs (2022 and After) 06/21/2022     HepB, Unspecified 03/27/2019, 04/25/2019, 10/15/2020     Hepatitis A Immunity: Titer/MD Dx 09/23/2021     Hepatitis B Immunity: Titer 09/23/2021     Influenza, Intradermal, Quad, Pf 03/27/2019, 10/15/2020, 07/27/2021     MMR 03/27/2019, 04/25/2019     Td (Adult), Adsorbed 03/27/2019, 04/25/2019     Tdap (Adacel,Boostrix) 10/27/2021, 05/05/2022     Varicella Immunity: Titer/MD Dx 09/23/2021         Immunizations needed postpartum: None    Who will be present at the delivery?  will be home with Children    Do you have a ?No If no, would you like one? No     What are your plans for pain control?Natural  Open to options    Who will cut the umbilical cord? Undecided    Do you plan to breastfeed?Yes    If your baby is a boy, would you like him circumcised?N/A    Name of baby's clinic: [facility]    Would you like your baby to have the recommended vitamin K shot to prevent bleeding, Hepatitis B shot, and eye ointment to prevent eye infections?    Next Appointment is: 1 week          Return to clinic in 1 week.    Filomena Manuel MD  I precepted today with Tom Manuel MD.    Subjective  Concerns: Feeling tired, heavy and sore after sitting a long time. Has pain on left side hip and groin area.    ROS:  No - Headache  No - Changes in vision  No - Chest Pain  No - Shortness of Breath  No - Nausea   No - Vomiting  No - Abdominal pain   No - Contractions  No - Dysuria   No - Vaginal Discharge    No - Vaginal bleeding   No - Loss of Fluid   No - Extremity swelling   Present - Fetal movement       Going to WIC? Yes    Patient Active Problem List   Diagnosis     Supervision of high risk pregnancy, antepartum     Language barrier, cultural differences     Food insecurity     Financial difficulties     Encounter  for health examination of refugee       Dolly Rich speaks [place]  so an  was used today.    Guidance:  post-partum care  GBS  signs of labor  pain control during labor    Do you need help getting a car seat? No  Do you need help getting a breast pump? No    Objective  BP 95/64   Pulse 86   Temp 97.7  F (36.5  C)   Resp 16   Wt 65.7 kg (144 lb 12.8 oz)   LMP 10/09/2021 (Approximate)   SpO2 97%   BMI 30.98 kg/m    No distress.  Gravid abdomen.  's.  Fundal height 36 cm.  no edema.  Cervix: not examined    Results  Blood type: O POS  No results found for any visits on 07/01/22.     Yes

## 2023-01-11 ENCOUNTER — MEDICAL CORRESPONDENCE (OUTPATIENT)
Dept: HEALTH INFORMATION MANAGEMENT | Facility: CLINIC | Age: 33
End: 2023-01-11

## 2023-02-14 ENCOUNTER — ALLIED HEALTH/NURSE VISIT (OUTPATIENT)
Dept: FAMILY MEDICINE | Facility: CLINIC | Age: 33
End: 2023-02-14
Payer: COMMERCIAL

## 2023-02-14 VITALS
HEART RATE: 84 BPM | OXYGEN SATURATION: 99 % | SYSTOLIC BLOOD PRESSURE: 111 MMHG | TEMPERATURE: 97.5 F | DIASTOLIC BLOOD PRESSURE: 67 MMHG

## 2023-02-14 DIAGNOSIS — Z30.9 CONTRACEPTIVE MANAGEMENT: Primary | ICD-10-CM

## 2023-02-14 PROCEDURE — 96372 THER/PROPH/DIAG INJ SC/IM: CPT

## 2023-02-14 PROCEDURE — 99207 PR NO CHARGE NURSE ONLY: CPT

## 2023-02-14 RX ADMIN — MEDROXYPROGESTERONE ACETATE 150 MG: 150 INJECTION, SUSPENSION INTRAMUSCULAR at 09:58

## 2023-02-14 NOTE — PROGRESS NOTES
Clinic Administered Medication Documentation    Administrations This Visit     medroxyPROGESTERone (DEPO-PROVERA) injection 150 mg     Admin Date  02/14/2023 Action  Given Dose  150 mg Route  Intramuscular Site  Left Deltoid Administered By  Kiana Haas MA    Ordering Provider: Sukumar Tobias MD    Patient Supplied?: No                  Depo Provera Documentation    URINE HCG: not indicated    Depo-Provera Standing Order inclusion/exclusion criteria reviewed.   Patient meets: inclusion criteria     BP: 111/67  LAST PAP/EXAM: No results found for: PAP    Prior to injection, verified patient identity using patient's name and date of birth. Medication was administered. Please see MAR and medication order for additional information.     Was entire vial of medication used? Yes  Vial/Syringe: Single dose vial  Expiration Date:  05/2024    Patient instructed to remain in clinic for 15 minutes.  NEXT INJECTION DUE: 5/2/23 - 5/16/23      Name of provider who requested the medication administration: Dr. Manuel  Name of provider on site (faculty or community preceptor) at the time of performing the medication administration: Dr. Manuel    Date of next administration: 5/2/2023-05/16/2023  Date of next office visit with provider to renew medication plan (must be seen annually): 08/15/2023      Kiana Manuel-Waldo CMA-AAMA

## 2023-05-15 ENCOUNTER — ALLIED HEALTH/NURSE VISIT (OUTPATIENT)
Dept: FAMILY MEDICINE | Facility: CLINIC | Age: 33
End: 2023-05-15
Payer: COMMERCIAL

## 2023-05-15 VITALS
HEART RATE: 85 BPM | DIASTOLIC BLOOD PRESSURE: 71 MMHG | BODY MASS INDEX: 28.89 KG/M2 | TEMPERATURE: 98.3 F | OXYGEN SATURATION: 100 % | WEIGHT: 135 LBS | SYSTOLIC BLOOD PRESSURE: 103 MMHG

## 2023-05-15 DIAGNOSIS — Z30.9 CONTRACEPTIVE MANAGEMENT: Primary | ICD-10-CM

## 2023-05-15 PROCEDURE — 99207 PR NO CHARGE NURSE ONLY: CPT | Performed by: FAMILY MEDICINE

## 2023-05-15 PROCEDURE — 99207 PR NO BILLABLE SERVICE THIS VISIT: CPT

## 2023-05-15 PROCEDURE — 96372 THER/PROPH/DIAG INJ SC/IM: CPT | Performed by: FAMILY MEDICINE

## 2023-05-15 RX ADMIN — MEDROXYPROGESTERONE ACETATE 150 MG: 150 INJECTION, SUSPENSION INTRAMUSCULAR at 08:35

## 2023-05-15 NOTE — NURSING NOTE
Clinic Administered Medication Documentation      Depo Provera Documentation    Depo-Provera Standing Order inclusion/exclusion criteria reviewed.     Is this the initial or subsequent dose of Depo Provera? Subsequent dose - patient is within the acceptable window of time (11-15 weeks) for subsequent injection. Pregnancy test not indicated.    Patient meets: inclusion criteria     Is there an active order (written within the past 365 days, with administrations remaining, not ) in the chart? Yes.     Prior to injection, verified patient identity using patient's name and date of birth. Medication was administered. Please see MAR and medication order for additional information.     Vial/Syringe: Single dose vial. Was entire vial of medication used? Yes    Patient instructed to remain in clinic for 15 minutes and report any adverse reaction to staff immediately.  NEXT INJECTION DUE: 23 - 8/15/23    Patient has no refills remaining. Pt advised to schedule next depo appt with provider- pt is due for renewal on 8/15/23        Name of provider who requested the medication administration: Mookie Manuel  Name of provider on site (faculty or community preceptor) at the time of performing the medication administration: Dr. Kingsley    Date of next administration: 23-8/15/23  Date of next office visit with provider to renew medication plan (must be seen annually): 8/15/23

## 2023-08-28 ENCOUNTER — OFFICE VISIT (OUTPATIENT)
Dept: FAMILY MEDICINE | Facility: CLINIC | Age: 33
End: 2023-08-28
Payer: COMMERCIAL

## 2023-08-28 VITALS
RESPIRATION RATE: 18 BRPM | DIASTOLIC BLOOD PRESSURE: 75 MMHG | SYSTOLIC BLOOD PRESSURE: 113 MMHG | TEMPERATURE: 97.8 F | OXYGEN SATURATION: 100 % | WEIGHT: 137.2 LBS | BODY MASS INDEX: 29.36 KG/M2 | HEART RATE: 95 BPM

## 2023-08-28 DIAGNOSIS — Z30.42 ENCOUNTER FOR SURVEILLANCE OF INJECTABLE CONTRACEPTIVE: Primary | ICD-10-CM

## 2023-08-28 LAB — HCG UR QL: NEGATIVE

## 2023-08-28 PROCEDURE — 96372 THER/PROPH/DIAG INJ SC/IM: CPT | Performed by: FAMILY MEDICINE

## 2023-08-28 PROCEDURE — 99212 OFFICE O/P EST SF 10 MIN: CPT | Mod: 25

## 2023-08-28 PROCEDURE — 81025 URINE PREGNANCY TEST: CPT

## 2023-08-28 RX ORDER — MEDROXYPROGESTERONE ACETATE 150 MG/ML
150 INJECTION, SUSPENSION INTRAMUSCULAR
Status: DISCONTINUED | OUTPATIENT
Start: 2023-08-28 | End: 2024-05-16

## 2023-08-28 RX ADMIN — MEDROXYPROGESTERONE ACETATE 150 MG: 150 INJECTION, SUSPENSION INTRAMUSCULAR at 14:02

## 2023-08-28 NOTE — NURSING NOTE
Clinic Administered Medication Documentation        Patient was given Depo Provera. Prior to medication administration, verified patient's identity using patient s name and date of birth. Please see MAR and medication order for additional information. Patient instructed to report any adverse reaction to staff immediately.    Vial/Syringe: Single dose vial. Was entire vial of medication used? Yes    NEXT INJECTION DUE: 11/14/23 - 12/18/23    Name of provider who requested the medication administration: Dr. JERARDO Manuel  Name of provider on site (faculty or community preceptor) at the time of performing the medication administration: Dr. Jaime Fleming    Date of next administration: 11/14/23 - 12/18/23  Date of next office visit with provider to renew medication plan (must be seen annually):8/28/2024

## 2023-08-28 NOTE — PATIENT INSTRUCTIONS
Thank you for taking the time to discuss your health with me today!    Today we discussed:  We have renewed your prescription for Depo injection. Please follow up in 3 months or earlier if you need anything.     As always, please call the clinic or message with any questions or concerns.     Best Wishes,  Filomena Manuel MD.

## 2023-08-28 NOTE — PROGRESS NOTES
"  Assessment & Plan     Encounter for surveillance of injectable contraceptive  - HCG qualitative urine-NEGATIVE   - medroxyPROGESTERone (DEPO-PROVERA) injection 150 mg       BMI:   Estimated body mass index is 29.36 kg/m  as calculated from the following:    Height as of 10/24/22: 1.456 m (4' 9.32\").    Weight as of this encounter: 62.2 kg (137 lb 3.2 oz).       No follow-ups on file.    Filomena Manuel MD  [facility]    Prince Alberto is a 33 year old, presenting for the following health issues:  Contraception (Late Depo)    HPI     Doing well. No problems with Depo. No side effects. Has not been getting periods since she gave birth. She is still breast feeding. She is 1-2 weeks late for this Depo injection and due for renewal. No intercourse for last 2 weeks.   Questions answered about pill vs injection.       Review of Systems   Constitutional, HEENT, cardiovascular, pulmonary, gi and gu systems are negative, except as otherwise noted.      Objective    /75   Pulse 95   Temp 97.8  F (36.6  C)   Resp 18   Wt 62.2 kg (137 lb 3.2 oz)   SpO2 100%   BMI 29.36 kg/m    Body mass index is 29.36 kg/m .  Physical Exam   GENERAL: healthy, alert and no distress  RESP: regular respiratory rate, effort  CV: Warm, dry skin  MS: no gross musculoskeletal defects noted, no edema    I precepted today with Dr Fleming.     Lilia Mnauel MD PGY-3            Follow Up Injection    Patient returning during stated date range given at previous visit: Yes      If here at the correct interval:   BP Readings from Last 1 Encounters:   08/28/23 113/75     Wt Readings from Last 1 Encounters:   08/28/23 62.2 kg (137 lb 3.2 oz)       Last Pap/exam date: 12/23/21      Side effects or problems with last injection?  No.  Date range is given to patient for next dose: 12 weeks.     See Medication Note for administration information    Staff Sig: Lilia Manuel MD       "

## 2023-08-28 NOTE — PROGRESS NOTES
Preceptor Attestation:    I discussed the patient with the resident and evaluated the patient in person. I have verified the content of the note, which accurately reflects my assessment of the patient and the plan of care.   Supervising Physician:  Jaime Fleming MD.

## 2023-11-22 ENCOUNTER — ALLIED HEALTH/NURSE VISIT (OUTPATIENT)
Dept: FAMILY MEDICINE | Facility: CLINIC | Age: 33
End: 2023-11-22
Payer: COMMERCIAL

## 2023-11-22 VITALS
WEIGHT: 134.8 LBS | HEART RATE: 91 BPM | TEMPERATURE: 97.6 F | HEIGHT: 57 IN | DIASTOLIC BLOOD PRESSURE: 83 MMHG | BODY MASS INDEX: 29.08 KG/M2 | SYSTOLIC BLOOD PRESSURE: 122 MMHG | OXYGEN SATURATION: 99 %

## 2023-11-22 DIAGNOSIS — Z30.42 ENCOUNTER FOR SURVEILLANCE OF INJECTABLE CONTRACEPTIVE: Primary | ICD-10-CM

## 2023-11-22 PROCEDURE — 96372 THER/PROPH/DIAG INJ SC/IM: CPT | Performed by: FAMILY MEDICINE

## 2023-11-22 RX ADMIN — MEDROXYPROGESTERONE ACETATE 150 MG: 150 INJECTION, SUSPENSION INTRAMUSCULAR at 14:20

## 2023-11-22 NOTE — PROGRESS NOTES
Clinic Administered Medication Documentation      Depo Provera Documentation    Depo-Provera Standing Order inclusion/exclusion criteria reviewed.     Is this the initial or subsequent dose of Depo Provera? Subsequent dose - patient is within the acceptable window of time (11-15 weeks) for subsequent injection. Pregnancy test not indicated.    Patient meets: inclusion criteria     Is there an active order (written within the past 365 days, with administrations remaining, not ) in the chart? Yes.     Prior to injection, verified patient identity using patient's name and date of birth. Medication was administered. Please see MAR and medication order for additional information.     Vial/Syringe: Single dose vial. Was entire vial of medication used? Yes    Patient instructed to remain in clinic for 15 minutes and report any adverse reaction to staff immediately.  NEXT INJECTION DUE: 24 - 3/7/24    Verified that the patient has refills remaining in their prescription.  Dr. Manuel requested Depo to be ordered   Dr. Fleming was preceptor at the time of administration on Aug 28,2023     Kiana Manuel-Waldo [facility]

## 2024-02-14 ENCOUNTER — ALLIED HEALTH/NURSE VISIT (OUTPATIENT)
Dept: FAMILY MEDICINE | Facility: CLINIC | Age: 34
End: 2024-02-14
Payer: COMMERCIAL

## 2024-02-14 VITALS — HEART RATE: 70 BPM | TEMPERATURE: 98 F | OXYGEN SATURATION: 99 %

## 2024-02-14 DIAGNOSIS — Z30.42 ENCOUNTER FOR SURVEILLANCE OF INJECTABLE CONTRACEPTIVE: Primary | ICD-10-CM

## 2024-02-14 PROCEDURE — 96372 THER/PROPH/DIAG INJ SC/IM: CPT | Performed by: FAMILY MEDICINE

## 2024-02-14 RX ADMIN — MEDROXYPROGESTERONE ACETATE 150 MG: 150 INJECTION, SUSPENSION INTRAMUSCULAR at 14:57

## 2024-02-14 NOTE — PROGRESS NOTES
Clinic Administered Medication Documentation      Depo Provera Documentation    Depo-Provera Standing Order inclusion/exclusion criteria reviewed.     Is this the initial or subsequent dose of Depo Provera? Subsequent dose - patient is within the acceptable window of time (11-15 weeks) for subsequent injection. Pregnancy test not indicated.    Patient meets: inclusion criteria     Is there an active order (written within the past 365 days, with administrations remaining, not ) in the chart? Yes.     Prior to injection, verified patient identity using patient's name and date of birth. Medication was administered. Please see MAR and medication order for additional information.     Vial/Syringe: Single dose vial. Was entire vial of medication used? Yes    Patient instructed to remain in clinic for 15 minutes and report any adverse reaction to staff immediately.  NEXT INJECTION DUE: 24 - 24    Verified that the patient has refills remaining in their prescription.    Name of provider who requested the medication administration: Dr. Manuel  Name of provider on site (faculty or community preceptor) at the time of performing the medication administration: Dr. Fleming    Date of next administration: 24-24  Date of next office visit with provider to renew medication plan (must be seen annually): 24

## 2024-05-09 ENCOUNTER — ALLIED HEALTH/NURSE VISIT (OUTPATIENT)
Dept: FAMILY MEDICINE | Facility: CLINIC | Age: 34
End: 2024-05-09
Payer: COMMERCIAL

## 2024-05-09 VITALS
HEART RATE: 80 BPM | OXYGEN SATURATION: 98 % | TEMPERATURE: 98.5 F | RESPIRATION RATE: 16 BRPM | BODY MASS INDEX: 29.64 KG/M2 | SYSTOLIC BLOOD PRESSURE: 107 MMHG | WEIGHT: 141.2 LBS | DIASTOLIC BLOOD PRESSURE: 75 MMHG | HEIGHT: 58 IN

## 2024-05-09 DIAGNOSIS — Z30.42 ENCOUNTER FOR SURVEILLANCE OF INJECTABLE CONTRACEPTIVE: Primary | ICD-10-CM

## 2024-05-09 PROCEDURE — 96372 THER/PROPH/DIAG INJ SC/IM: CPT | Performed by: FAMILY MEDICINE

## 2024-05-09 RX ADMIN — MEDROXYPROGESTERONE ACETATE 150 MG: 150 INJECTION, SUSPENSION INTRAMUSCULAR at 13:53

## 2024-05-09 NOTE — PROGRESS NOTES
Clinic Administered Medication Documentation      Depo Provera Documentation    Depo-Provera Standing Order inclusion/exclusion criteria reviewed.       Is this the initial or subsequent dose of Depo Provera? Subsequent dose - patient is within the acceptable window of time (11-15 weeks) for subsequent injection. Pregnancy test not indicated.    Patient meets: inclusion criteria     Is there an active order (written within the past 365 days, with administrations remaining, not ) in the chart? Yes.     Prior to injection, verified patient identity using patient's name and date of birth. Medication was administered. Please see MAR and medication order for additional information.     Vial/Syringe: Single dose vial. Was entire vial of medication used? Yes    Patient instructed to remain in clinic for 15 minutes and report any adverse reaction to staff immediately.  NEXT INJECTION DUE: 24 - 24    Patient has no refills remaining. Refill encounter opened, order pended and Routed to the provider    Julissa Burt MA

## 2024-05-16 ENCOUNTER — OFFICE VISIT (OUTPATIENT)
Dept: FAMILY MEDICINE | Facility: CLINIC | Age: 34
End: 2024-05-16
Payer: COMMERCIAL

## 2024-05-16 VITALS
OXYGEN SATURATION: 100 % | SYSTOLIC BLOOD PRESSURE: 104 MMHG | RESPIRATION RATE: 16 BRPM | DIASTOLIC BLOOD PRESSURE: 68 MMHG | TEMPERATURE: 97.7 F | HEIGHT: 57 IN | WEIGHT: 144 LBS | BODY MASS INDEX: 31.07 KG/M2 | HEART RATE: 86 BPM

## 2024-05-16 DIAGNOSIS — Z30.011 ENCOUNTER FOR INITIAL PRESCRIPTION OF CONTRACEPTIVE PILLS: Primary | ICD-10-CM

## 2024-05-16 PROCEDURE — 99213 OFFICE O/P EST LOW 20 MIN: CPT | Mod: GC

## 2024-05-16 RX ORDER — DROSPIRENONE AND ETHINYL ESTRADIOL 0.02-3(28)
1 KIT ORAL DAILY
Qty: 90 TABLET | Refills: 4 | Status: SHIPPED | OUTPATIENT
Start: 2024-07-25

## 2024-05-16 NOTE — PROGRESS NOTES
"Preceptor Attestation:  Vitals:    05/16/24 1409   BP: 104/68   Pulse: 86   Resp: 16   Temp: 97.7  F (36.5  C)   TempSrc: Oral   SpO2: 100%   Weight: 65.3 kg (144 lb)   Height: 1.448 m (4' 9\")          I discussed the patient with the resident and evaluated the patient in person. I have verified the content of the note, which accurately reflects my assessment of the patient and the plan of care.   Supervising Physician:  Emilia Waters MD    "

## 2024-05-16 NOTE — PROGRESS NOTES
"  Assessment & Plan     Encounter for initial prescription of contraceptive pills  Desires to stop Depo. She just received last on 5/9/24. Will order for hormonal BC pills to start week before her next Depo would be due.   - drospirenone-ethinyl estradiol (ASHLIE) 3-0.02 MG tablet  Dispense: 90 tablet; Refill: 4        BMI  Estimated body mass index is 31.16 kg/m  as calculated from the following:    Height as of this encounter: 1.448 m (4' 9\").    Weight as of this encounter: 65.3 kg (144 lb).       No follow-ups on file.    Prince Alberto is a 34 year old, presenting for the following health issues:  Contraception (Change birth control from the injection.   Wants to try pill.)        5/16/2024     2:18 PM   Additional Questions   Roomed by Yeimi         5/16/2024    Information    services provided? Yes    Yes   Language Other    Other   Other [facility]   Type of interpretation provided Telephone    Telephone    name [facility]    Agency [facility]  Services    Katia MCDERMOTT     Dolly presents today for follow up contraception. She would like to change her current birth control to pil form. Started Depo several months ago. She had 2 periods since started and has gained weight which she is not happy with. She has gained about 7 lbs. She would like to get a regular period. She has taken the pill before and tolerated it well. She last had the Depo shot a few weeks ago.       Review of Systems  Constitutional, HEENT, cardiovascular, pulmonary, gi and gu systems are negative, except as otherwise noted.      Objective    /68   Pulse 86   Temp 97.7  F (36.5  C) (Oral)   Resp 16   Ht 1.448 m (4' 9\")   Wt 65.3 kg (144 lb)   SpO2 100%   BMI 31.16 kg/m    Body mass index is 31.16 kg/m .  Physical Exam   GENERAL: alert and no distress  RESP: lungs clear to auscultation - no rales, rhonchi or wheezes  CV: regular rate and rhythm, normal S1 S2, no " S3 or S4, no murmur, click or rub, no peripheral edema  MS: no gross musculoskeletal defects noted, no edema            Signed Electronically by: Filomena Manuel MD

## 2025-02-14 ENCOUNTER — OFFICE VISIT (OUTPATIENT)
Dept: FAMILY MEDICINE | Facility: CLINIC | Age: 35
End: 2025-02-14
Payer: COMMERCIAL

## 2025-02-14 VITALS
HEART RATE: 85 BPM | RESPIRATION RATE: 20 BRPM | SYSTOLIC BLOOD PRESSURE: 130 MMHG | HEIGHT: 57 IN | WEIGHT: 150.2 LBS | OXYGEN SATURATION: 99 % | TEMPERATURE: 98.2 F | BODY MASS INDEX: 32.4 KG/M2 | DIASTOLIC BLOOD PRESSURE: 90 MMHG

## 2025-02-14 DIAGNOSIS — K21.00 GASTROESOPHAGEAL REFLUX DISEASE WITH ESOPHAGITIS, UNSPECIFIED WHETHER HEMORRHAGE: ICD-10-CM

## 2025-02-14 DIAGNOSIS — Z00.00 ROUTINE GENERAL MEDICAL EXAMINATION AT A HEALTH CARE FACILITY: Primary | ICD-10-CM

## 2025-02-14 DIAGNOSIS — S29.011A MUSCLE STRAIN OF CHEST WALL, INITIAL ENCOUNTER: ICD-10-CM

## 2025-02-14 PROCEDURE — 99395 PREV VISIT EST AGE 18-39: CPT | Mod: 25

## 2025-02-14 PROCEDURE — 90656 IIV3 VACC NO PRSV 0.5 ML IM: CPT

## 2025-02-14 PROCEDURE — 90480 ADMN SARSCOV2 VAC 1/ONLY CMP: CPT

## 2025-02-14 PROCEDURE — 90471 IMMUNIZATION ADMIN: CPT

## 2025-02-14 PROCEDURE — 91320 SARSCV2 VAC 30MCG TRS-SUC IM: CPT

## 2025-02-14 RX ORDER — ACETAMINOPHEN 500 MG
500-1000 TABLET ORAL EVERY 8 HOURS PRN
Qty: 60 TABLET | Refills: 1 | Status: SHIPPED | OUTPATIENT
Start: 2025-02-14

## 2025-02-14 RX ORDER — FAMOTIDINE 20 MG/1
20 TABLET, FILM COATED ORAL DAILY PRN
Qty: 30 TABLET | Refills: 3 | Status: SHIPPED | OUTPATIENT
Start: 2025-02-14

## 2025-02-14 RX ORDER — IBUPROFEN 200 MG
200-400 TABLET ORAL EVERY 6 HOURS PRN
Qty: 60 TABLET | Refills: 1 | Status: SHIPPED | OUTPATIENT
Start: 2025-02-14

## 2025-02-14 SDOH — HEALTH STABILITY: PHYSICAL HEALTH: ON AVERAGE, HOW MANY MINUTES DO YOU ENGAGE IN EXERCISE AT THIS LEVEL?: PATIENT DECLINED

## 2025-02-14 SDOH — HEALTH STABILITY: PHYSICAL HEALTH: ON AVERAGE, HOW MANY DAYS PER WEEK DO YOU ENGAGE IN MODERATE TO STRENUOUS EXERCISE (LIKE A BRISK WALK)?: 0 DAYS

## 2025-02-14 ASSESSMENT — SOCIAL DETERMINANTS OF HEALTH (SDOH): HOW OFTEN DO YOU GET TOGETHER WITH FRIENDS OR RELATIVES?: MORE THAN THREE TIMES A WEEK

## 2025-02-14 NOTE — PROGRESS NOTES
Preceptor Attestation:    I discussed the patient with the resident and evaluated the patient in person. I have verified the content of the note, which accurately reflects my assessment of the patient and the plan of care.   Supervising Physician:  Nabor Khan MD.

## 2025-02-14 NOTE — PROGRESS NOTES
"Preventive Care Visit  [facility]  Luc Ferro DO, Family Medicine  Feb 14, 2025      Assessment & Plan     Routine general medical examination at a health care facility  -Discussed recommendations for PAP testing. She will return for pap. She is up to date on vaccinations. See below for acute concerns.    Muscle strain of chest wall, initial encounter  Discussed with Phre that her left sided chest and scapular discomfort appear most consistent with a muscular strain. There is tenderness to palpation of the chest wall and left scapula. No pain or discomfort with exertion. ACS is unlikely given no exertional pain and tenderness present to palpation. No known injury to the area. Recommend ibuprofen/tylenol as needed for symptomatic improvement. Recommend she follow-up on an as needed basis if symptoms worsen or fail to improve with management as outlined above. Patient verbalized clear understanding and agreement to treatment plan and had no further questions or concerns at this time.  - ibuprofen (ADVIL/MOTRIN) 200 MG tablet  Dispense: 60 tablet; Refill: 1  - acetaminophen (TYLENOL) 500 MG tablet  Dispense: 60 tablet; Refill: 1    Gastroesophageal reflux disease with esophagitis, unspecified whether hemorrhage  - She reports occasional epigastric discomfort about 2 times per week. Recommend a trial of as needed famotidine and follow-up as needed if symptoms worsen or fail to improve with this management. Patient verbalized clear understanding and agreement to treatment plan and had no further questions or concerns at this time.  - famotidine (PEPCID) 20 MG tablet  Dispense: 30 tablet; Refill: 3    BMI  Estimated body mass index is 32.5 kg/m  as calculated from the following:    Height as of this encounter: 1.448 m (4' 9\").    Weight as of this encounter: 68.1 kg (150 lb 3.2 oz).       Counseling  Appropriate preventive services were addressed with this patient via screening, questionnaire, " or discussion as appropriate for fall prevention, nutrition, physical activity, Tobacco-use cessation, social engagement, weight loss and cognition.  Checklist reviewing preventive services available has been given to the patient.  Reviewed patient's diet, addressing concerns and/or questions.       Return in about 53 weeks (around 2/20/2026), or if symptoms worsen or fail to improve, for Annual Wellness Visit.    Prince Alberto is a 34 year old, presenting for the following:  Physical        2/14/2025     3:53 PM   Additional Questions   Roomed by Mao   Accompanied by self and son         2/14/2025    Information    services provided? Yes   Language Other   Other [facility]   Type of interpretation provided Telephone    name Demarco Velázquez    ID [id number]    Agency [facility]  Services          HPI  She reports that she has been having a few weeks of left sided chest and back discomfort. There is no pain with exertion. She reports that there is pain in the area to palpation. She denies any known injury. She has not tried tylenol or ibuprofen. She knows that she is due for a pap, but does not want to do a pap today. She reports some epigastric discomfort about two time a week. She reports a sour/burning taste in her mouth. She reports no additional concerns at this time.     Health Care Directive  Patient does not have a Health Care Directive: Discussed advance care planning with patient; information given to patient to review.      2/14/2025   General Health   How would you rate your overall physical health? Good   Feel stress (tense, anxious, or unable to sleep) Not at all         2/14/2025   Nutrition   Three or more servings of calcium each day? Yes   Diet: Regular (no restrictions)   How many servings of fruit and vegetables per day? (!) 2-3   How many sweetened beverages each day? 0-1         2/14/2025   Exercise   Days per week of moderate/strenous  exercise 0 days   Average minutes spent exercising at this level Patient declined   (!) EXERCISE CONCERN      2/14/2025   Social Factors   Frequency of gathering with friends or relatives More than three times a week   Worry food won't last until get money to buy more No   Food not last or not have enough money for food? No   Do you have housing? (Housing is defined as stable permanent housing and does not include staying ouside in a car, in a tent, in an abandoned building, in an overnight shelter, or couch-surfing.) Yes   Are you worried about losing your housing? No   Lack of transportation? No   Unable to get utilities (heat,electricity)? No         2/14/2025   Dental   Dentist two times every year? Yes            Today's PHQ-2 Score:       2/14/2025     3:55 PM   PHQ-2 ( 1999 [employer])   Q1: Little interest or pleasure in doing things 0   Q2: Feeling down, depressed or hopeless 0   PHQ-2 Score 0           2/14/2025   Substance Use   Alcohol more than 3/day or more than 7/wk No   Do you use any other substances recreationally? No     Social History     Tobacco Use    Smoking status: Never    Smokeless tobacco: Never   Vaping Use    Vaping status: Never Used   Substance Use Topics    Alcohol use: Never    Drug use: Never           2/14/2025   STI Screening   New sexual partner(s) since last STI/HIV test? No     History of abnormal Pap smear: No - age 30-64 HPV with reflex Pap every 5 years recommended        12/23/2021     9:17 AM   PAP / HPV   PAP Negative for Intraepithelial Lesion or Malignancy (NILM)            2/14/2025   Contraception/Family Planning   Questions about contraception or family planning No       Reviewed and updated as needed this visit by Provider                   Review of Systems  Constitutional, HEENT, cardiovascular, pulmonary, gi and gu systems are negative, except as otherwise noted.  ROS reviewed, see HPI for pertinent positives and negatives.  Luc Ferro,        Objective   "  Exam  /90   Pulse 85   Temp 98.2  F (36.8  C) (Oral)   Resp 20   Ht 1.448 m (4' 9\")   Wt 68.1 kg (150 lb 3.2 oz)   LMP 02/11/2025 (Exact Date)   SpO2 99%   BMI 32.50 kg/m     Estimated body mass index is 32.5 kg/m  as calculated from the following:    Height as of this encounter: 1.448 m (4' 9\").    Weight as of this encounter: 68.1 kg (150 lb 3.2 oz).    Physical Exam  Constitutional:       General: She is not in acute distress.     Appearance: She is not toxic-appearing or diaphoretic.   HENT:      Head: Normocephalic and atraumatic.      Right Ear: Tympanic membrane, ear canal and external ear normal.      Left Ear: Ear canal and external ear normal.      Nose: No congestion.      Mouth/Throat:      Mouth: Mucous membranes are moist.      Pharynx: No oropharyngeal exudate or posterior oropharyngeal erythema.   Eyes:      General: No scleral icterus.     Conjunctiva/sclera: Conjunctivae normal.   Cardiovascular:      Rate and Rhythm: Normal rate and regular rhythm.      Heart sounds: No murmur heard.     No gallop.   Pulmonary:      Effort: Pulmonary effort is normal. No respiratory distress.      Breath sounds: No wheezing or rales.   Chest:      Chest wall: No tenderness.   Abdominal:      General: There is no distension.   Musculoskeletal:      Comments: Mild tenderness to palpation of left scapular region, and upper left ribs on their lateral aspect. Left sided paraspinal hypertonicity, in thoracic region   Neurological:      General: No focal deficit present.      Mental Status: She is alert and oriented to person, place, and time.   Psychiatric:         Mood and Affect: Mood normal.         Behavior: Behavior normal.       Precepted with Dr. Nabor Khan MD who agrees with assessment and plan as outlined above    Signed Electronically by: Luc Ferro DO    "

## 2025-02-14 NOTE — PATIENT INSTRUCTIONS
Patient Education   Preventive Care Advice   This is general advice given by our system to help you stay healthy. However, your care team may have specific advice just for you. Please talk to your care team about your preventive care needs.  Nutrition  Eat 5 or more servings of fruits and vegetables each day.  Try wheat bread, brown rice and whole grain pasta (instead of white bread, rice, and pasta).  Get enough calcium and vitamin D. Check the label on foods and aim for 100% of the RDA (recommended daily allowance).  Lifestyle  Exercise at least 150 minutes each week  (30 minutes a day, 5 days a week).  Do muscle strengthening activities 2 days a week. These help control your weight and prevent disease.  No smoking.  Wear sunscreen to prevent skin cancer.  Have a dental exam and cleaning every 6 months.  Yearly exams  See your health care team every year to talk about:  Any changes in your health.  Any medicines your care team has prescribed.  Preventive care, family planning, and ways to prevent chronic diseases.  Shots (vaccines)   HPV shots (up to age 26), if you've never had them before.  Hepatitis B shots (up to age 59), if you've never had them before.  COVID-19 shot: Get this shot when it's due.  Flu shot: Get a flu shot every year.  Tetanus shot: Get a tetanus shot every 10 years.  Pneumococcal, hepatitis A, and RSV shots: Ask your care team if you need these based on your risk.  Shingles shot (for age 50 and up)  General health tests  Diabetes screening:  Starting at age 35, Get screened for diabetes at least every 3 years.  If you are younger than age 35, ask your care team if you should be screened for diabetes.  Cholesterol test: At age 39, start having a cholesterol test every 5 years, or more often if advised.  Bone density scan (DEXA): At age 50, ask your care team if you should have this scan for osteoporosis (brittle bones).  Hepatitis C: Get tested at least once in your life.  STIs (sexually  transmitted infections)  Before age 24: Ask your care team if you should be screened for STIs.  After age 24: Get screened for STIs if you're at risk. You are at risk for STIs (including HIV) if:  You are sexually active with more than one person.  You don't use condoms every time.  You or a partner was diagnosed with a sexually transmitted infection.  If you are at risk for HIV, ask about PrEP medicine to prevent HIV.  Get tested for HIV at least once in your life, whether you are at risk for HIV or not.  Cancer screening tests  Cervical cancer screening: If you have a cervix, begin getting regular cervical cancer screening tests starting at age 21.  Breast cancer scan (mammogram): If you've ever had breasts, begin having regular mammograms starting at age 40. This is a scan to check for breast cancer.  Colon cancer screening: It is important to start screening for colon cancer at age 45.  Have a colonoscopy test every 10 years (or more often if you're at risk) Or, ask your provider about stool tests like a FIT test every year or Cologuard test every 3 years.  To learn more about your testing options, visit:   .  For help making a decision, visit:   https://bit.ly/lb82573.  Prostate cancer screening test: If you have a prostate, ask your care team if a prostate cancer screening test (PSA) at age 55 is right for you.  Lung cancer screening: If you are a current or former smoker ages 50 to 80, ask your care team if ongoing lung cancer screenings are right for you.  For informational purposes only. Not to replace the advice of your health care provider. Copyright   2023 [employer]. All rights reserved. Clinically reviewed by the [facility]. [employer] [id number] - REV 01/24.

## 2025-03-03 ENCOUNTER — OFFICE VISIT (OUTPATIENT)
Dept: FAMILY MEDICINE | Facility: CLINIC | Age: 35
End: 2025-03-03
Payer: COMMERCIAL

## 2025-03-03 VITALS
BODY MASS INDEX: 32.15 KG/M2 | SYSTOLIC BLOOD PRESSURE: 115 MMHG | DIASTOLIC BLOOD PRESSURE: 78 MMHG | WEIGHT: 149 LBS | TEMPERATURE: 97.7 F | HEART RATE: 69 BPM | HEIGHT: 57 IN | RESPIRATION RATE: 20 BRPM | OXYGEN SATURATION: 99 %

## 2025-03-03 DIAGNOSIS — Z12.4 CERVICAL CANCER SCREENING: Primary | ICD-10-CM

## 2025-03-03 DIAGNOSIS — S29.011A MUSCLE STRAIN OF CHEST WALL, INITIAL ENCOUNTER: ICD-10-CM

## 2025-03-03 RX ORDER — BACLOFEN 10 MG/1
10 TABLET ORAL 3 TIMES DAILY PRN
Qty: 60 TABLET | Refills: 3 | Status: SHIPPED | OUTPATIENT
Start: 2025-03-03

## 2025-03-03 NOTE — PROGRESS NOTES
"  Assessment & Plan     Cervical cancer screening  Pap smear done today  Last pap was when she [place], cannot see results but supposedly in 2021 and was normal. Last child was born on 7/10/2022. On exam cervix seemed to have ectropion from 12 o'clock to 4 o'clock. No abnormal bleeding, has regular menses, denies post coital bleeding. Will contact with results.   - HPV and Gynecologic Cytology Panel - Recommended Age 30 - 65 Years    Muscle strain of chest wall, initial encounter  Left sided chest and scapular pain present for almost 1 month now. Worst at night. Feels like shooting pain occasionally. No tenderness to palpation in chest wall, shoulder or scapular today. Not worse with movement or exertion. Tylenol and Ibuprofen did not help. Return to care in 1 month after some physical therapy appointments have been completed.   - Physical Therapy  Referral; Future  - baclofen (LIORESAL) 10 MG tablet; Take 1 tablet (10 mg) by mouth 3 times daily as needed for muscle spasms.    BMI  Estimated body mass index is 32.24 kg/m  as calculated from the following:    Height as of this encounter: 1.448 m (4' 9\").    Weight as of this encounter: 67.6 kg (149 lb).       Subjective   Phre is a 35 year old, presenting for the following health issues:  Pap Smear      3/3/2025     3:00 PM   Additional Questions   Roomed by Teddy GARCIA   Accompanied by Self         3/3/2025    Information    services provided? Yes   Language Other   Other [facility]   Type of interpretation provided Telephone    name [facility]    ID [id number]    Agency [facility]  Services     HPI    Still having left shoulder/chest pain  Discussed what is pap smear  Here for pap today      Objective    /78   Pulse 69   Temp 97.7  F (36.5  C) (Tympanic)   Resp 20   Ht 1.448 m (4' 9\")   Wt 67.6 kg (149 lb)   LMP 02/11/2025 (Exact Date)   SpO2 99%   BMI 32.24 kg/m  "   Body mass index is 32.24 kg/m .  Physical Exam   GENERAL: alert and no distress  RESP: no increased work of breathing  CV: well perfused extremities  MS: no gross musculoskeletal defects noted, no edema, no point tenderness at left shoulder or arm, some tenderness at angle of left scapula, full left shoulder ROM  PELVIC: dark red colored hyperpigmentation, possibly ectropion from 12 o'clock to 4 o'clock, multiparous os, no abnormal skin lesions, normal amount of mucous        Signed Electronically by: Mandi Posada DO    Today this patient was seen by and precepted with Dr. Tobias

## 2025-03-11 LAB
HPV HR 12 DNA CVX QL NAA+PROBE: NEGATIVE
HPV16 DNA CVX QL NAA+PROBE: NEGATIVE
HPV18 DNA CVX QL NAA+PROBE: NEGATIVE
HUMAN PAPILLOMA VIRUS FINAL DIAGNOSIS: NORMAL

## 2025-03-19 ENCOUNTER — THERAPY VISIT (OUTPATIENT)
Dept: PHYSICAL THERAPY | Facility: CLINIC | Age: 35
End: 2025-03-19
Payer: COMMERCIAL

## 2025-03-19 DIAGNOSIS — S29.011A MUSCLE STRAIN OF CHEST WALL, INITIAL ENCOUNTER: ICD-10-CM

## 2025-03-19 PROCEDURE — 97161 PT EVAL LOW COMPLEX 20 MIN: CPT | Mod: GP

## 2025-03-19 PROCEDURE — 97110 THERAPEUTIC EXERCISES: CPT | Mod: GP

## 2025-03-19 NOTE — PROGRESS NOTES
PHYSICAL THERAPY EVALUATION  Type of Visit: Evaluation       Fall Risk Screen:  Fall screen completed by: PT  Have you fallen 2 or more times in the past year?: No  Have you fallen and had an injury in the past year?: No  Is patient a fall risk?: No    Subjective         Presenting condition or subjective complaint:    Date of onset: 03/19/25    Relevant medical history:     Dates & types of surgery:      Prior diagnostic imaging/testing results:       Prior therapy history for the same diagnosis, illness or injury:        Prior Level of Function  Transfers:   Ambulation:   ADL:   IADL:     Living Environment  Social support:     Type of home:     Stairs to enter the home:         Ramp:     Stairs inside the home:         Help at home:    Equipment owned:       Employment:      Hobbies/Interests:      Patient goals for therapy:      Physical Therapist Assessment    KEY PT FINDINGS:  1) Thoracic spine limitations  2) Left sided costochondral dysfunction  3) Negative shoulder screen    Signs and symptoms are consistent with costochondral dysfunction.      Subjective History    Patient was referred by Mandi Posada for  Muscle strain of chest wall, initial encounter [S29.011A]   Referring provider plan: Muscle strain of chest wall, initial encounter  Left sided chest and scapular pain present for almost 1 month now. Worst at night. Feels like shooting pain occasionally. No tenderness to palpation in chest wall, shoulder or scapular today. Not worse with movement or exertion. Tylenol and Ibuprofen did not help. Return to care in 1 month after some physical therapy appointments have been completed.   - Physical Therapy  Referral; Future  - baclofen (LIORESAL) 10 MG tablet; Take 1 tablet (10 mg) by mouth 3 times daily as needed for muscle spasms.    PT Subjective:   Patient is a 35 year old female presenting to outpatient physical therapy with left sided chest wall pain. She is also have some thoracic back pain  as well, they seem related. She notices that this pain is underneath her left breast, can radiate up into her sternum and around her rib cage into her left shoulder blade. She notices this when she is sleeping or when she is walking around. Sometimes she notices this same pain with deep breaths as well. She notices this pain in particular when she lies on her left side or back only, not right side. She started to notice this pain last year back in April, is started insidiously. Denies any numbness or tingling in this distribution. Sometimes when she is sitting for prolonged periods that can provoke some symptoms as well.  Pain Level at Rest: 1/10  Pain Level with Use: 8/10  Pain Location: thoracic spine and chest wall and left side rib cage  Pain Quality: Sharp and severe  Pain Frequency: intermittent  Pain is Worst: nighttime  Pain is Exacerbated By: sleeping on left side or back, walking, deep breaths  Pain is Relieved By: rest  Pain Progression: Unchanged     PMH:   Patient Active Problem List   Diagnosis    Language barrier, cultural differences    Encounter for health examination of refugee    Labor and delivery affected by shoulder dystocia     (normal spontaneous vaginal delivery)    Second degree perineal laceration during delivery     Medications:   Current Outpatient Medications   Medication Sig Dispense Refill    acetaminophen (TYLENOL) 500 MG tablet Take 1-2 tablets (500-1,000 mg) by mouth every 8 hours as needed for mild pain. 60 tablet 1    baclofen (LIORESAL) 10 MG tablet Take 1 tablet (10 mg) by mouth 3 times daily as needed for muscle spasms. 60 tablet 3    drospirenone-ethinyl estradiol (ASHLIE) 3-0.02 MG tablet Take 1 tablet by mouth daily 90 tablet 4    famotidine (PEPCID) 20 MG tablet Take 1 tablet (20 mg) by mouth daily as needed (acid reflux). 30 tablet 3    ibuprofen (ADVIL/MOTRIN) 200 MG tablet Take 1-2 tablets (200-400 mg) by mouth every 6 hours as needed for pain. 60 tablet 1     No  current facility-administered medications for this visit.           Imaging: None  Red Flag Screening: negative  Prior Treatment Includes: None  Lifestyle History:  Occupation: School Work  Experience with physical activity: Daily activities  General Health Assessment: NT.  Referral recommended? No  Additional Considerations: [employer] [place] - Needs      Patient Goals for Physical Therapy: To feel better and have no pain, back to normal.         Objective   Objective Examination    Posture/Observation: Rounded shoulders, Forward head    Cervical/Thoracic Screen:  Thoracic ROT FILIPE and EXT produced sternal pain    Scapulothoraic Rhythm:   Hands at side: No obvious findings  Hands on hips: No obvious findings  Scaption: No obvious findings  Repeated Scaption: No obvious findings    Shoulder AROM: WNL  Shoulder MMT: WNL    Palpation:  TTP to sternal PA, costochondral joints on L - lower ribs; no pain with thoracic spine PA Testing    Joint Mobility: Negative SCJ or ACJ mobility deficits    Special tests: + if reproductive of patient's primary complaint  - hooking maneuver negative      Assessment & Plan   CLINICAL IMPRESSIONS  Medical Diagnosis: Muscle strain of chest wall, initial encounter (S29.011A)    Treatment Diagnosis: Rib Dysfunction L   Impression/Assessment: Patient is a 35 year old female with left sided chest wall complaints.  The following significant findings have been identified: Pain, Decreased ROM/flexibility, Decreased joint mobility, Impaired muscle performance, Decreased activity tolerance, and Impaired posture. These impairments interfere with their ability to perform self care tasks, work tasks, recreational activities, household chores, driving , household mobility, and community mobility as compared to previous level of function.     Clinical Decision Making (Complexity):  Clinical Presentation: Stable/Uncomplicated  Clinical Presentation Rationale: based on medical and personal factors  listed in PT evaluation  Clinical Decision Making (Complexity): Low complexity    PLAN OF CARE  Treatment Interventions:  Interventions: Manual Therapy, Neuromuscular Re-education, Therapeutic Activity, Therapeutic Exercise, Self-Care/Home Management, Aquatic Therapy    Long Term Goals     PT Goal 1  Goal Identifier: LTG1  Goal Description: Patient will be able to sleep on her back with 0/10 pain for a full night  Rationale: to maximize safety and independence with performance of ADLs and functional tasks;to maximize safety and independence within the home;to maximize safety and independence within the community;to maximize safety and independence with transportation;to maximize safety and independence with self cares  Target Date: 05/14/25  PT Goal 2  Goal Identifier: LTG2  Goal Description: Patient will report an increase of at least 8 on the SPADI to demonstrate MCID  Rationale: to maximize safety and independence with performance of ADLs and functional tasks;to maximize safety and independence within the home;to maximize safety and independence within the community;to maximize safety and independence with transportation;to maximize safety and independence with self cares  Target Date: 05/14/25      Frequency of Treatment: 1x/2wks  Duration of Treatment: 8 weeks    Recommended Referrals to Other Professionals:   Education Assessment:   Learner/Method: Patient    Risks and benefits of evaluation/treatment have been explained.   Patient/Family/caregiver agrees with Plan of Care.     Evaluation Time:     PT Eval, Low Complexity Minutes (64015): 20       Signing Clinician: Leroy Armstrong, PT        [facility]                                                                                   OUTPATIENT PHYSICAL THERAPY      PLAN OF TREATMENT FOR OUTPATIENT REHABILITATION   Patient's Last Name, First Name, M.I.  Mo,Phre    YOB: 1990   Provider's Name   [facility]   Medical Record No.  [id number]     Onset Date: 03/19/25  Start of Care Date: 03/19/25     Medical Diagnosis:  Muscle strain of chest wall, initial encounter (S29.011A)      PT Treatment Diagnosis:  Rib Dysfunction L Plan of Treatment  Frequency/Duration: 1x/2wks/ 8 weeks    Certification date from 03/19/25 to 05/14/25         See note for plan of treatment details and functional goals     Leroy Armstrong, PT                         I CERTIFY THE NEED FOR THESE SERVICES FURNISHED UNDER        THIS PLAN OF TREATMENT AND WHILE UNDER MY CARE     (Physician attestation of this document indicates review and certification of the therapy plan).              Referring Provider:  Mandi Posada    Initial Assessment  See Epic Evaluation- Start of Care Date: 03/19/25

## 2025-04-07 ENCOUNTER — OFFICE VISIT (OUTPATIENT)
Dept: FAMILY MEDICINE | Facility: CLINIC | Age: 35
End: 2025-04-07
Payer: COMMERCIAL

## 2025-04-07 VITALS
TEMPERATURE: 97.9 F | HEIGHT: 57 IN | DIASTOLIC BLOOD PRESSURE: 73 MMHG | SYSTOLIC BLOOD PRESSURE: 105 MMHG | WEIGHT: 148.6 LBS | OXYGEN SATURATION: 97 % | BODY MASS INDEX: 32.06 KG/M2 | HEART RATE: 86 BPM | RESPIRATION RATE: 16 BRPM

## 2025-04-07 DIAGNOSIS — S29.011A MUSCLE STRAIN OF CHEST WALL, INITIAL ENCOUNTER: ICD-10-CM

## 2025-04-07 DIAGNOSIS — M99.08: Primary | ICD-10-CM

## 2025-04-07 DIAGNOSIS — R42 DIZZINESS: ICD-10-CM

## 2025-04-07 LAB
ERYTHROCYTE [DISTWIDTH] IN BLOOD BY AUTOMATED COUNT: 13.1 % (ref 10–15)
HCT VFR BLD AUTO: 42.2 % (ref 35–47)
HGB BLD-MCNC: 14.1 G/DL (ref 11.7–15.7)
MCH RBC QN AUTO: 29.4 PG (ref 26.5–33)
MCHC RBC AUTO-ENTMCNC: 33.4 G/DL (ref 31.5–36.5)
MCV RBC AUTO: 88 FL (ref 78–100)
PLATELET # BLD AUTO: 358 10E3/UL (ref 150–450)
RBC # BLD AUTO: 4.8 10E6/UL (ref 3.8–5.2)
WBC # BLD AUTO: 8 10E3/UL (ref 4–11)

## 2025-04-07 NOTE — PROGRESS NOTES
"  Assessment & Plan     Muscle strain of chest wall  Somatic dysfunction of costochondral region  Still having left chest wall and left back tenderness near angle of left scapula. No inciting injuries. Is taking Baclofen, Tylenol and Ibuprofen tight now. Boggy tissue texture change above left angle of scapula. No bony tenderness.   -Continue physical therapy (3/19/25-5/12/25 roughly for 8 weeks)  - diclofenac (VOLTAREN) 1 % topical gel; Apply 2 g topically 2 times daily.  -Stop Ibuprofen  -Start Voltaren gel  -heating pad  -Continue Tylenol     Dizziness  At end of visit she mentioned she is having some dizziness occasionally. Ordered labs and will furher delve into at next visit.   - CBC with Platelets and Reflex to Iron Studies; Future  - Basic metabolic panel; Future    BMI  Estimated body mass index is 32.16 kg/m  as calculated from the following:    Height as of this encounter: 1.448 m (4' 9\").    Weight as of this encounter: 67.4 kg (148 lb 9.6 oz).       Subjective   Phre is a 35 year old, presenting for the following health issues:  Results (Pap) and Spasms (Muscle spasms in mid back below shoulder blade.)        4/7/2025     3:33 PM   Additional Questions   Roomed by Yasmeen         4/7/2025    Information    services provided? Yes   Language Other   Other [facility]   Type of interpretation provided Face-to-face    name Demarco Barbie    ID [id number]    Agency [facility]  Services     HPI    Intermittent pain between shoulder blades  Feels like pressure pain  Started last year around April  But worsened recently  No recent injuries  sometimes related to increased activity that day but not always  Going to PT for 8 weeks, just started on 3/19  Gave her at home exercises to do also  Usually she feels same after session  Will finish the 8 weeks     Takes Baclofen every night   Makes her sleepy  Takes 200 mg Ibuprofen and 500 mg Tylenol daily usually in " "evening  Does not take in the morning because she forgets    Reviewed results of pap.       Objective    /73   Pulse 86   Temp 97.9  F (36.6  C)   Resp 16   Ht 1.448 m (4' 9\")   Wt 67.4 kg (148 lb 9.6 oz)   LMP 02/11/2025 (Exact Date)   SpO2 97%   BMI 32.16 kg/m    Body mass index is 32.16 kg/m .  Physical Exam   GENERAL: alert and no distress  RESP: lungs clear to auscultation - no rales, rhonchi or wheezes  CV: regular rate and rhythm, normal S1 S2, no S3 or S4, no murmur, click or rub, no peripheral edema  MS: no gross musculoskeletal defects noted, no edema, some tenderness at angle of left scapula, full left shoulder ROM, boggy tissue texture change  above left angle of scapula        Signed Electronically by: Mandi Posada DO    Today this patient was seen by and precepted with Dr. Hall    "

## 2025-04-07 NOTE — PROGRESS NOTES
Preceptor Attestation:    I discussed the patient with the resident and evaluated the patient in person. I have verified the content of the note, which accurately reflects my assessment of the patient and the plan of care.   Supervising Physician:  Annetta Hall MD.

## 2025-04-08 LAB
ANION GAP SERPL CALCULATED.3IONS-SCNC: 10 MMOL/L (ref 7–15)
BUN SERPL-MCNC: 5.8 MG/DL (ref 6–20)
CALCIUM SERPL-MCNC: 9.4 MG/DL (ref 8.8–10.4)
CHLORIDE SERPL-SCNC: 106 MMOL/L (ref 98–107)
CREAT SERPL-MCNC: 0.67 MG/DL (ref 0.51–0.95)
EGFRCR SERPLBLD CKD-EPI 2021: >90 ML/MIN/1.73M2
GLUCOSE SERPL-MCNC: 101 MG/DL (ref 70–99)
HCO3 SERPL-SCNC: 26 MMOL/L (ref 22–29)
POTASSIUM SERPL-SCNC: 3.6 MMOL/L (ref 3.4–5.3)
SODIUM SERPL-SCNC: 142 MMOL/L (ref 135–145)

## 2025-04-29 ENCOUNTER — OFFICE VISIT (OUTPATIENT)
Dept: FAMILY MEDICINE | Facility: CLINIC | Age: 35
End: 2025-04-29
Payer: COMMERCIAL

## 2025-04-29 VITALS
RESPIRATION RATE: 16 BRPM | TEMPERATURE: 97.3 F | WEIGHT: 144.4 LBS | HEART RATE: 82 BPM | HEIGHT: 57 IN | BODY MASS INDEX: 31.15 KG/M2 | DIASTOLIC BLOOD PRESSURE: 76 MMHG | OXYGEN SATURATION: 100 % | SYSTOLIC BLOOD PRESSURE: 110 MMHG

## 2025-04-29 DIAGNOSIS — Z13.1 SCREENING FOR DIABETES MELLITUS: ICD-10-CM

## 2025-04-29 DIAGNOSIS — G43.009 MIGRAINE WITHOUT AURA AND WITHOUT STATUS MIGRAINOSUS, NOT INTRACTABLE: Primary | ICD-10-CM

## 2025-04-29 LAB
EST. AVERAGE GLUCOSE BLD GHB EST-MCNC: 105 MG/DL
HBA1C MFR BLD: 5.3 % (ref 0–5.6)

## 2025-04-29 RX ORDER — SUMATRIPTAN 50 MG/1
50 TABLET, FILM COATED ORAL
Qty: 30 TABLET | Refills: 1 | Status: SHIPPED | OUTPATIENT
Start: 2025-04-29

## 2025-04-29 NOTE — PROGRESS NOTES
"  Assessment & Plan     Migraine without aura and without status migrainosus, not intractable  Patient's headaches are very specific for migraines. Will do a trial of triptans and see me back in a month. At this point I will determine if prophylaxis is needed or PRN triptans will suffice.   - SUMAtriptan (IMITREX) 50 MG tablet  Dispense: 30 tablet; Refill: 1    Screening for diabetes mellitus  Patient had a bmp completed with a glucose of 101. She would like to be evaluated for diabetes. Will order A1c.   - Hemoglobin A1c    Patient will continue to see me and patient's migraines are not yet at goal requiring medication management and follow up.     The longitudinal plan of care for the diagnosis(es)/condition(s) as documented were addressed during this visit. Due to the added complexity in care, I will continue to support Dolly in the subsequent management and with ongoing continuity of care.      BMI  Estimated body mass index is 31.25 kg/m  as calculated from the following:    Height as of this encounter: 1.448 m (4' 9\").    Weight as of this encounter: 65.5 kg (144 lb 6.4 oz).     Subjective   Phrvincent is a 35 year old, presenting for the following health issues:  Dizziness and Headache (A few days.)      4/29/2025     3:09 PM   Additional Questions   Roomed by Yeimi         4/29/2025    Information    services provided? Yes   Language Other   Other [facility]   Type of interpretation provided Video    ID [facility]    Agency [facility]  Services     HPI      Patient here with concern of headaches, this has been going on for about a month. 3-4 days per week. Patient feels them in the mornings and evenings. Numbness on her face when this happens. Feels the pain behind her left eye. She has nausea during these times. Bright lights and sounds bother her when this happens.         Objective    /76   Pulse 82   Temp 97.3  F (36.3  C)   Resp 16   Ht 1.448 m " "(4' 9\")   Wt 65.5 kg (144 lb 6.4 oz)   SpO2 100%   BMI 31.25 kg/m    Body mass index is 31.25 kg/m .  Physical Exam   No physical exam completed today. Patient is here for largely medication management, refills and or counseling.       Signed Electronically by: Beau Lay MD  Staffed with Nathan Fallon MD  "

## 2025-06-03 ENCOUNTER — OFFICE VISIT (OUTPATIENT)
Dept: FAMILY MEDICINE | Facility: CLINIC | Age: 35
End: 2025-06-03
Payer: COMMERCIAL

## 2025-06-03 VITALS
SYSTOLIC BLOOD PRESSURE: 109 MMHG | OXYGEN SATURATION: 98 % | RESPIRATION RATE: 20 BRPM | WEIGHT: 145.8 LBS | TEMPERATURE: 97.7 F | BODY MASS INDEX: 31.45 KG/M2 | HEART RATE: 76 BPM | HEIGHT: 57 IN | DIASTOLIC BLOOD PRESSURE: 77 MMHG

## 2025-06-03 DIAGNOSIS — G43.009 MIGRAINE WITHOUT AURA AND WITHOUT STATUS MIGRAINOSUS, NOT INTRACTABLE: ICD-10-CM

## 2025-06-03 PROCEDURE — 3074F SYST BP LT 130 MM HG: CPT

## 2025-06-03 PROCEDURE — 3078F DIAST BP <80 MM HG: CPT

## 2025-06-03 PROCEDURE — 99214 OFFICE O/P EST MOD 30 MIN: CPT | Mod: GC

## 2025-06-03 RX ORDER — SUMATRIPTAN 50 MG/1
50 TABLET, FILM COATED ORAL
Qty: 60 TABLET | Refills: 1 | Status: SHIPPED | OUTPATIENT
Start: 2025-06-03

## 2025-06-03 NOTE — PROGRESS NOTES
"Preceptor attestation:  Vital signs reviewed: /77 (BP Location: Left arm, Patient Position: Sitting, Cuff Size: Adult Regular)   Pulse 76   Temp 97.7  F (36.5  C) (Oral)   Resp 20   Ht 1.448 m (4' 9\")   Wt 66.1 kg (145 lb 12.8 oz)   LMP 05/15/2025   SpO2 98%   BMI 31.55 kg/m      Patient seen, evaluated, and discussed with the resident.  I verified the content of the note, which accurately reflects my assessment of the patient and the plan of care.    Supervising physician: Savanna Booth MD  [facility]  "

## 2025-06-03 NOTE — PROGRESS NOTES
"Resident/Fellow Attestation   I, Beau Lay MD, was present with the medical/LEOBARDO student who participated in the service and in the documentation of the note.  I have verified the history and personally performed the physical exam and medical decision making.  I agree with the assessment and plan of care as documented in the note.      Beau Lay MD  PGY2     Assessment & Plan     Headache and dizziness  Patient has been taking Sumatriptan as prophylactic instead of abortive. We discussed starting amitriptyline daily as prophylaxis instead. However, patient shared value of not wanting to take medications daily and we decided to continue with Sumatriptan PRN. Will plan for follow up in one month. Educated patient on only needing to take it as needed and shouldn't take more than 14 times in a month. Chronic condition not yet at goal requiring medication management.     (G43.009) Migraine without aura and without status migrainosus, not intractable  Plan: SUMAtriptan (IMITREX) 50 MG tablet      Screening for diabetes mellitus  Updated patient on result of Hemoglobin A1c which was 5.3. She is not prediabetic. Patient expressed understanding.    BMI  Estimated body mass index is 31.55 kg/m  as calculated from the following:    Height as of this encounter: 1.448 m (4' 9\").    Weight as of this encounter: 66.1 kg (145 lb 12.8 oz).     Subjective   Phre is a 35 year old, presenting for the following health issues:  RECHECK (Follow-up last visit for headache dizziness and lab result)      6/3/2025     2:37 PM   Additional Questions   Roomed by madhavi   Accompanied by self         6/3/2025    Information    services provided? Yes   Language Other   Other [facility]   Type of interpretation provided Face-to-face    name [employer]    Agency [facility]    Dolly is a 35-year-old female who presents for follow-up of migraine a week ago. She was previously experiencing " "three to four episodes of headache and dizziness a week. Since her previous visit, she has been taking Sumatriptan once a day and has not had recurrence of her migraines since. She mentions that after taking the medication, she experiences increased dizziness for about thirty minutes which spontaneously resolves. Denies symptoms of vision changes, numbness, or paresthesia.  No nausea or vomiting.       Objective    /77 (BP Location: Left arm, Patient Position: Sitting, Cuff Size: Adult Regular)   Pulse 76   Temp 97.7  F (36.5  C) (Oral)   Resp 20   Ht 1.448 m (4' 9\")   Wt 66.1 kg (145 lb 12.8 oz)   LMP 05/15/2025   SpO2 98%   BMI 31.55 kg/m    Body mass index is 31.55 kg/m .  Physical Exam   No physical exam completed today. Patient is here for largely medication management, refills and or counseling.       Signed Electronically by: Beau Lay MD  "